# Patient Record
Sex: MALE | Race: OTHER | Employment: OTHER | URBAN - METROPOLITAN AREA
[De-identification: names, ages, dates, MRNs, and addresses within clinical notes are randomized per-mention and may not be internally consistent; named-entity substitution may affect disease eponyms.]

---

## 2017-03-01 ENCOUNTER — HOSPITAL ENCOUNTER (OUTPATIENT)
Dept: PHYSICAL THERAPY | Age: 37
Setting detail: THERAPIES SERIES
Discharge: HOME | End: 2017-03-01
Payer: COMMERCIAL

## 2017-03-01 PROCEDURE — 97162 PT EVAL MOD COMPLEX 30 MIN: CPT

## 2017-03-01 PROCEDURE — G0283 ELEC STIM OTHER THAN WOUND: HCPCS

## 2017-03-01 PROCEDURE — 97110 THERAPEUTIC EXERCISES: CPT

## 2017-03-01 ASSESSMENT — PAIN DESCRIPTION - LOCATION: LOCATION: BACK

## 2017-03-01 ASSESSMENT — PAIN DESCRIPTION - DESCRIPTORS: DESCRIPTORS: ACHING

## 2017-03-01 ASSESSMENT — PAIN SCALES - GENERAL: PAINLEVEL_OUTOF10: 8

## 2017-03-01 ASSESSMENT — PAIN DESCRIPTION - ORIENTATION: ORIENTATION: LOWER

## 2017-03-07 ENCOUNTER — HOSPITAL ENCOUNTER (OUTPATIENT)
Dept: MRI IMAGING | Age: 37
Discharge: HOME | End: 2017-03-07
Payer: COMMERCIAL

## 2017-03-07 VITALS — HEIGHT: 68 IN | WEIGHT: 170 LBS | BODY MASS INDEX: 25.76 KG/M2

## 2017-03-07 DIAGNOSIS — G89.29 CHRONIC PAIN OF RIGHT KNEE: ICD-10-CM

## 2017-03-07 DIAGNOSIS — M25.561 CHRONIC PAIN OF RIGHT KNEE: ICD-10-CM

## 2017-03-07 PROCEDURE — 73721 MRI JNT OF LWR EXTRE W/O DYE: CPT

## 2017-03-09 ENCOUNTER — HOSPITAL ENCOUNTER (OUTPATIENT)
Dept: PHYSICAL THERAPY | Age: 37
Setting detail: THERAPIES SERIES
Discharge: HOME | End: 2017-03-09
Payer: COMMERCIAL

## 2017-03-09 PROCEDURE — 97140 MANUAL THERAPY 1/> REGIONS: CPT

## 2017-03-09 PROCEDURE — 97032 APPL MODALITY 1+ESTIM EA 15: CPT

## 2017-03-09 PROCEDURE — 97110 THERAPEUTIC EXERCISES: CPT

## 2017-03-09 ASSESSMENT — PAIN DESCRIPTION - DESCRIPTORS: DESCRIPTORS: ACHING

## 2017-03-09 ASSESSMENT — PAIN DESCRIPTION - PAIN TYPE: TYPE: CHRONIC PAIN

## 2017-03-09 ASSESSMENT — PAIN SCALES - GENERAL: PAINLEVEL_OUTOF10: 6

## 2017-03-09 ASSESSMENT — PAIN DESCRIPTION - LOCATION: LOCATION: BACK

## 2017-03-09 ASSESSMENT — PAIN DESCRIPTION - ORIENTATION: ORIENTATION: LOWER

## 2017-03-10 ENCOUNTER — HOSPITAL ENCOUNTER (OUTPATIENT)
Dept: PHYSICAL THERAPY | Age: 37
Setting detail: THERAPIES SERIES
Discharge: HOME | End: 2017-03-10
Payer: COMMERCIAL

## 2017-03-17 ENCOUNTER — HOSPITAL ENCOUNTER (OUTPATIENT)
Dept: PHYSICAL THERAPY | Age: 37
Setting detail: THERAPIES SERIES
Discharge: HOME | End: 2017-03-17
Payer: COMMERCIAL

## 2017-03-17 PROCEDURE — G0283 ELEC STIM OTHER THAN WOUND: HCPCS

## 2017-03-17 PROCEDURE — 97110 THERAPEUTIC EXERCISES: CPT

## 2017-03-17 ASSESSMENT — PAIN DESCRIPTION - LOCATION: LOCATION: KNEE;BACK

## 2017-03-17 ASSESSMENT — PAIN SCALES - GENERAL: PAINLEVEL_OUTOF10: 10

## 2017-03-17 ASSESSMENT — PAIN DESCRIPTION - FREQUENCY: FREQUENCY: CONTINUOUS

## 2017-03-17 ASSESSMENT — PAIN DESCRIPTION - ORIENTATION: ORIENTATION: RIGHT;LEFT

## 2017-03-17 ASSESSMENT — PAIN DESCRIPTION - DESCRIPTORS: DESCRIPTORS: SHARP

## 2017-03-22 ENCOUNTER — HOSPITAL ENCOUNTER (OUTPATIENT)
Dept: PHYSICAL THERAPY | Age: 37
Setting detail: THERAPIES SERIES
Discharge: HOME | End: 2017-03-22
Payer: COMMERCIAL

## 2017-03-24 ENCOUNTER — HOSPITAL ENCOUNTER (OUTPATIENT)
Dept: PHYSICAL THERAPY | Age: 37
Setting detail: THERAPIES SERIES
Discharge: HOME | End: 2017-03-24
Payer: COMMERCIAL

## 2017-03-29 ENCOUNTER — HOSPITAL ENCOUNTER (OUTPATIENT)
Dept: PHYSICAL THERAPY | Age: 37
Setting detail: THERAPIES SERIES
Discharge: HOME | End: 2017-03-29
Payer: COMMERCIAL

## 2017-03-29 PROCEDURE — G0283 ELEC STIM OTHER THAN WOUND: HCPCS

## 2017-03-29 PROCEDURE — 97110 THERAPEUTIC EXERCISES: CPT

## 2017-03-29 ASSESSMENT — PAIN DESCRIPTION - PAIN TYPE: TYPE: CHRONIC PAIN

## 2017-03-29 ASSESSMENT — PAIN SCALES - GENERAL: PAINLEVEL_OUTOF10: 8

## 2017-03-29 ASSESSMENT — PAIN DESCRIPTION - ORIENTATION: ORIENTATION: POSTERIOR;RIGHT

## 2017-03-29 ASSESSMENT — PAIN DESCRIPTION - LOCATION: LOCATION: KNEE

## 2017-03-29 ASSESSMENT — PAIN DESCRIPTION - DESCRIPTORS: DESCRIPTORS: SHARP

## 2017-03-29 ASSESSMENT — PAIN DESCRIPTION - FREQUENCY: FREQUENCY: CONTINUOUS

## 2017-03-31 ENCOUNTER — HOSPITAL ENCOUNTER (OUTPATIENT)
Dept: PHYSICAL THERAPY | Age: 37
Setting detail: THERAPIES SERIES
Discharge: HOME | End: 2017-03-31
Payer: COMMERCIAL

## 2017-03-31 PROCEDURE — 97110 THERAPEUTIC EXERCISES: CPT

## 2017-03-31 PROCEDURE — G0283 ELEC STIM OTHER THAN WOUND: HCPCS

## 2017-03-31 ASSESSMENT — PAIN DESCRIPTION - ORIENTATION: ORIENTATION: RIGHT;OTHER (COMMENT)

## 2017-03-31 ASSESSMENT — PAIN SCALES - GENERAL: PAINLEVEL_OUTOF10: 4

## 2017-03-31 ASSESSMENT — PAIN DESCRIPTION - PAIN TYPE: TYPE: ACUTE PAIN

## 2017-03-31 ASSESSMENT — PAIN DESCRIPTION - LOCATION: LOCATION: KNEE

## 2017-03-31 ASSESSMENT — PAIN DESCRIPTION - DESCRIPTORS: DESCRIPTORS: ACHING

## 2017-04-05 ENCOUNTER — HOSPITAL ENCOUNTER (OUTPATIENT)
Dept: PHYSICAL THERAPY | Age: 37
Setting detail: THERAPIES SERIES
Discharge: HOME | End: 2017-04-05
Payer: COMMERCIAL

## 2017-04-07 ENCOUNTER — HOSPITAL ENCOUNTER (OUTPATIENT)
Dept: PHYSICAL THERAPY | Age: 37
Setting detail: THERAPIES SERIES
Discharge: HOME | End: 2017-04-07
Payer: COMMERCIAL

## 2017-04-07 PROCEDURE — 97110 THERAPEUTIC EXERCISES: CPT

## 2017-04-07 PROCEDURE — G0283 ELEC STIM OTHER THAN WOUND: HCPCS

## 2017-04-07 ASSESSMENT — PAIN DESCRIPTION - LOCATION: LOCATION: KNEE

## 2017-04-07 ASSESSMENT — PAIN DESCRIPTION - DESCRIPTORS: DESCRIPTORS: SHARP

## 2017-04-07 ASSESSMENT — PAIN DESCRIPTION - PAIN TYPE: TYPE: ACUTE PAIN

## 2017-04-07 ASSESSMENT — PAIN DESCRIPTION - ORIENTATION: ORIENTATION: RIGHT

## 2017-04-07 ASSESSMENT — PAIN DESCRIPTION - FREQUENCY: FREQUENCY: CONTINUOUS

## 2017-04-07 ASSESSMENT — PAIN SCALES - GENERAL: PAINLEVEL_OUTOF10: 7

## 2017-04-12 ENCOUNTER — HOSPITAL ENCOUNTER (OUTPATIENT)
Dept: PHYSICAL THERAPY | Age: 37
Setting detail: THERAPIES SERIES
Discharge: HOME | End: 2017-04-12
Payer: COMMERCIAL

## 2017-04-12 PROBLEM — S83.249A ACUTE MEDIAL MENISCUS TEAR: Status: ACTIVE | Noted: 2017-04-12

## 2017-04-14 ENCOUNTER — HOSPITAL ENCOUNTER (OUTPATIENT)
Dept: PHYSICAL THERAPY | Age: 37
Setting detail: THERAPIES SERIES
Discharge: HOME | End: 2017-04-14
Payer: COMMERCIAL

## 2017-07-21 PROBLEM — M47.812 CERVICAL SPONDYLOSIS WITHOUT MYELOPATHY: Status: ACTIVE | Noted: 2017-07-21

## 2020-02-24 ENCOUNTER — APPOINTMENT (OUTPATIENT)
Dept: GENERAL RADIOLOGY | Age: 40
End: 2020-02-24
Payer: COMMERCIAL

## 2020-02-24 ENCOUNTER — HOSPITAL ENCOUNTER (EMERGENCY)
Age: 40
Discharge: HOME | End: 2020-02-25
Payer: COMMERCIAL

## 2020-02-24 ENCOUNTER — APPOINTMENT (OUTPATIENT)
Dept: CT IMAGING | Age: 40
End: 2020-02-24
Payer: COMMERCIAL

## 2020-02-24 VITALS
DIASTOLIC BLOOD PRESSURE: 65 MMHG | SYSTOLIC BLOOD PRESSURE: 104 MMHG | HEART RATE: 64 BPM | WEIGHT: 175 LBS | HEIGHT: 67 IN | RESPIRATION RATE: 18 BRPM | BODY MASS INDEX: 27.47 KG/M2 | OXYGEN SATURATION: 97 % | TEMPERATURE: 98.7 F

## 2020-02-24 LAB
ALBUMIN SERPL-MCNC: 3.5 G/DL (ref 3.5–4.6)
ALP BLD-CCNC: 54 U/L (ref 35–104)
ALT SERPL-CCNC: 20 U/L (ref 0–41)
ANION GAP SERPL CALCULATED.3IONS-SCNC: 10 MEQ/L (ref 9–15)
APTT: 29.8 SEC (ref 24.4–36.8)
AST SERPL-CCNC: 15 U/L (ref 0–40)
BASOPHILS ABSOLUTE: 0.1 K/UL (ref 0–0.2)
BASOPHILS RELATIVE PERCENT: 0.7 %
BILIRUB SERPL-MCNC: <0.2 MG/DL (ref 0.2–0.7)
BUN BLDV-MCNC: 7 MG/DL (ref 6–20)
CALCIUM SERPL-MCNC: 8.5 MG/DL (ref 8.5–9.9)
CHLORIDE BLD-SCNC: 104 MEQ/L (ref 95–107)
CO2: 26 MEQ/L (ref 20–31)
CREAT SERPL-MCNC: 0.69 MG/DL (ref 0.7–1.2)
EOSINOPHILS ABSOLUTE: 0.5 K/UL (ref 0–0.7)
EOSINOPHILS RELATIVE PERCENT: 4.2 %
GFR AFRICAN AMERICAN: >60
GFR NON-AFRICAN AMERICAN: >60
GLOBULIN: 2 G/DL (ref 2.3–3.5)
GLUCOSE BLD-MCNC: 104 MG/DL (ref 70–99)
HCT VFR BLD CALC: 43.9 % (ref 42–52)
HEMOGLOBIN: 14.9 G/DL (ref 14–18)
INR BLD: 0.9
LYMPHOCYTES ABSOLUTE: 3.6 K/UL (ref 1–4.8)
LYMPHOCYTES RELATIVE PERCENT: 33.2 %
MCH RBC QN AUTO: 30.4 PG (ref 27–31.3)
MCHC RBC AUTO-ENTMCNC: 33.9 % (ref 33–37)
MCV RBC AUTO: 89.8 FL (ref 80–100)
MONOCYTES ABSOLUTE: 1.3 K/UL (ref 0.2–0.8)
MONOCYTES RELATIVE PERCENT: 11.7 %
NEUTROPHILS ABSOLUTE: 5.4 K/UL (ref 1.4–6.5)
NEUTROPHILS RELATIVE PERCENT: 50.2 %
PDW BLD-RTO: 13.8 % (ref 11.5–14.5)
PLATELET # BLD: 206 K/UL (ref 130–400)
POC CREATININE WHOLE BLOOD: 0.9
POTASSIUM SERPL-SCNC: 3.7 MEQ/L (ref 3.4–4.9)
PROTHROMBIN TIME: 12 SEC (ref 12.3–14.9)
RBC # BLD: 4.89 M/UL (ref 4.7–6.1)
SODIUM BLD-SCNC: 140 MEQ/L (ref 135–144)
TOTAL PROTEIN: 5.5 G/DL (ref 6.3–8)
WBC # BLD: 10.8 K/UL (ref 4.8–10.8)

## 2020-02-24 PROCEDURE — 80053 COMPREHEN METABOLIC PANEL: CPT

## 2020-02-24 PROCEDURE — 99284 EMERGENCY DEPT VISIT MOD MDM: CPT

## 2020-02-24 PROCEDURE — 73030 X-RAY EXAM OF SHOULDER: CPT

## 2020-02-24 PROCEDURE — 85730 THROMBOPLASTIN TIME PARTIAL: CPT

## 2020-02-24 PROCEDURE — 72125 CT NECK SPINE W/O DYE: CPT

## 2020-02-24 PROCEDURE — 85610 PROTHROMBIN TIME: CPT

## 2020-02-24 PROCEDURE — 73590 X-RAY EXAM OF LOWER LEG: CPT

## 2020-02-24 PROCEDURE — 36415 COLL VENOUS BLD VENIPUNCTURE: CPT

## 2020-02-24 PROCEDURE — 6360000004 HC RX CONTRAST MEDICATION: Performed by: PHYSICIAN ASSISTANT

## 2020-02-24 PROCEDURE — 85025 COMPLETE CBC W/AUTO DIFF WBC: CPT

## 2020-02-24 PROCEDURE — 74177 CT ABD & PELVIS W/CONTRAST: CPT

## 2020-02-24 PROCEDURE — 73564 X-RAY EXAM KNEE 4 OR MORE: CPT

## 2020-02-24 RX ORDER — TIZANIDINE 2 MG/1
2 TABLET ORAL EVERY 8 HOURS PRN
Qty: 15 TABLET | Refills: 0 | Status: SHIPPED | OUTPATIENT
Start: 2020-02-24 | End: 2020-12-18 | Stop reason: SDUPTHER

## 2020-02-24 RX ORDER — MELOXICAM 7.5 MG/1
15 TABLET ORAL DAILY
Qty: 30 TABLET | Refills: 0 | Status: SHIPPED | OUTPATIENT
Start: 2020-02-24

## 2020-02-24 RX ADMIN — IOPAMIDOL 100 ML: 755 INJECTION, SOLUTION INTRAVENOUS at 22:30

## 2020-02-24 ASSESSMENT — PAIN DESCRIPTION - PAIN TYPE: TYPE: ACUTE PAIN

## 2020-02-24 ASSESSMENT — PAIN DESCRIPTION - LOCATION: LOCATION: BACK

## 2020-02-24 ASSESSMENT — PAIN DESCRIPTION - ORIENTATION: ORIENTATION: MID;LOWER

## 2020-02-24 ASSESSMENT — PAIN DESCRIPTION - DESCRIPTORS: DESCRIPTORS: SHARP;BURNING

## 2020-02-25 LAB
GFR AFRICAN AMERICAN: >60
GFR NON-AFRICAN AMERICAN: >60
PERFORMED ON: NORMAL
POC CREATININE: 0.9 MG/DL (ref 0.9–1.3)
POC SAMPLE TYPE: NORMAL

## 2020-02-25 ASSESSMENT — ENCOUNTER SYMPTOMS
EYE DISCHARGE: 0
SHORTNESS OF BREATH: 0
APNEA: 0
COUGH: 0
BACK PAIN: 1
PHOTOPHOBIA: 0
ANAL BLEEDING: 0
ABDOMINAL DISTENTION: 0
VOMITING: 0
VOICE CHANGE: 0
NAUSEA: 0

## 2020-02-25 NOTE — ED NOTES
CT informed of I-stat Creat 0.9. They will call for patient when ready.      Jose Ji RN  02/24/20 [id number]

## 2020-02-25 NOTE — ED PROVIDER NOTES
bruise/bleed easily. Psychiatric/Behavioral: Negative for behavioral problems, self-injury and sleep disturbance. All other systems reviewed and are negative. Except as noted above the remainder of the review of systems was reviewed and negative. PAST MEDICAL HISTORY     Past Medical History:   Diagnosis Date    Chronic back pain          SURGICALHISTORY       Past Surgical History:   Procedure Laterality Date    HERNIA REPAIR      NECK SURGERY      tumor removed from neck         CURRENT MEDICATIONS       Previous Medications    ATOMOXETINE (STRATTERA) 40 MG CAPSULE    Take 40 mg by mouth daily    ATOMOXETINE (STRATTERA) 80 MG CAPSULE    TAKE ONE CAPSULE BY MOUTH EVERY DAY IN THE MORNING    BUPROPION (WELLBUTRIN SR) 100 MG SR TABLET        DICLOFENAC SODIUM 1 % GEL    APPLY 4 GRAMS TOPICALLY TO KNEE JOINT 4 TIMES A DAY AS NEEDED FOR PAIN    GABAPENTIN (NEURONTIN) 600 MG TABLET    TAKE 1 TABLET BY MOUTH 4 TIMES DAILY    HYDROXYZINE (VISTARIL) 50 MG CAPSULE        VENTOLIN  (90 BASE) MCG/ACT INHALER    INHALE 2 PUFFS EVERY 4 TO 6 HOURS AS NEEDED       ALLERGIES     Patient has no known allergies. FAMILY HISTORY       Family History   Problem Relation Age of Onset    Heart Disease Mother           SOCIAL HISTORY       Social History     Socioeconomic History    Marital status: Single     Spouse name: Not on file    Number of children: Not on file    Years of education: Not on file    Highest education level: Not on file   Occupational History    Not on file   Social Needs    Financial resource strain: Not on file    Food insecurity:     Worry: Not on file     Inability: Not on file    Transportation needs:     Medical: Not on file     Non-medical: Not on file   Tobacco Use    Smoking status: Current Every Day Smoker     Types: Cigarettes    Smokeless tobacco: Never Used   Substance and Sexual Activity    Alcohol use:  Yes     Alcohol/week: 0.0 standard drinks     Comment: RARELY Heart sounds: Normal heart sounds. Pulmonary:      Effort: Pulmonary effort is normal. No respiratory distress. Breath sounds: Normal breath sounds. No stridor. No wheezing or rhonchi. Chest:      Chest wall: No tenderness. Abdominal:      General: Bowel sounds are normal. There is no distension. Palpations: Abdomen is soft. Tenderness: There is no abdominal tenderness. Musculoskeletal: Normal range of motion. General: Tenderness present. No deformity. Back:         Arms:         Legs:    Skin:     General: Skin is warm. Findings: No erythema. Neurological:      Mental Status: He is alert and oriented to person, place, and time. DIAGNOSTIC RESULTS     EKG: All EKG's are interpreted by the Emergency Department Physician who either signs or Co-signsthis chart in the absence of a cardiologist.         RADIOLOGY:   Non-plain filmimages such as CT, Ultrasound and MRI are read by the radiologist. Plain radiographic images are visualized and preliminarily interpreted by the emergency physician with the below findings:    See CAT scan reports and preliminary radiology reports.   Negative fracture    Interpretation per the Radiologist below, if available at the time ofthis note:    [address]    (Results Pending)   CT ABDOMEN PELVIS W IV CONTRAST Additional Contrast? None    (Results Pending)   XR SHOULDER RIGHT (MIN 2 VIEWS)    (Results Pending)   XR KNEE RIGHT (MIN 4 VIEWS)    (Results Pending)   XR TIBIA FIBULA LEFT (2 VIEWS)    (Results Pending)         ED BEDSIDE ULTRASOUND:   Performed by ED Physician - none    LABS:  Labs Reviewed   COMPREHENSIVE METABOLIC PANEL - Abnormal; Notable for the following components:       Result Value    Glucose 104 (*)     CREATININE 0.69 (*)     Total Protein 5.5 (*)     Globulin 2.0 (*)     All other components within normal limits   CBC WITH AUTO DIFFERENTIAL - Abnormal; Notable for the following components:

## 2021-06-07 ENCOUNTER — OFFICE VISIT (OUTPATIENT)
Dept: PAIN MANAGEMENT | Age: 41
End: 2021-06-07
Payer: COMMERCIAL

## 2021-06-07 VITALS — TEMPERATURE: 96.9 F | WEIGHT: 185 LBS | HEIGHT: 69 IN | BODY MASS INDEX: 27.4 KG/M2

## 2021-06-07 DIAGNOSIS — M79.604 RIGHT LEG PAIN: ICD-10-CM

## 2021-06-07 DIAGNOSIS — M47.817 LUMBOSACRAL SPONDYLOSIS WITHOUT MYELOPATHY: Primary | ICD-10-CM

## 2021-06-07 DIAGNOSIS — M47.816 SPONDYLOSIS OF LUMBAR REGION WITHOUT MYELOPATHY OR RADICULOPATHY: ICD-10-CM

## 2021-06-07 PROCEDURE — 4004F PT TOBACCO SCREEN RCVD TLK: CPT | Performed by: PHYSICAL MEDICINE & REHABILITATION

## 2021-06-07 PROCEDURE — 99213 OFFICE O/P EST LOW 20 MIN: CPT | Performed by: PHYSICAL MEDICINE & REHABILITATION

## 2021-06-07 PROCEDURE — G8419 CALC BMI OUT NRM PARAM NOF/U: HCPCS | Performed by: PHYSICAL MEDICINE & REHABILITATION

## 2021-06-07 PROCEDURE — G8427 DOCREV CUR MEDS BY ELIG CLIN: HCPCS | Performed by: PHYSICAL MEDICINE & REHABILITATION

## 2021-06-07 RX ORDER — TIZANIDINE 2 MG/1
2 TABLET ORAL 2 TIMES DAILY PRN
Qty: 30 TABLET | Refills: 0 | Status: SHIPPED | OUTPATIENT
Start: 2021-06-07 | End: 2021-07-06 | Stop reason: SDUPTHER

## 2021-06-07 RX ORDER — GABAPENTIN 600 MG/1
600 TABLET ORAL 4 TIMES DAILY
Qty: 120 TABLET | Refills: 0 | Status: SHIPPED | OUTPATIENT
Start: 2021-06-07 | End: 2021-07-06 | Stop reason: SDUPTHER

## 2021-06-07 ASSESSMENT — ENCOUNTER SYMPTOMS
NAUSEA: 0
DIARRHEA: 0
CONSTIPATION: 0
BACK PAIN: 1
SHORTNESS OF BREATH: 0

## 2021-06-07 NOTE — PROGRESS NOTES
Jhonny Andres  ([birth year])    6/7/2021    Subjective:     Jhonny Andres is 41 y.o. male who complains today of:    Chief Complaint   Patient presents with    Back Pain     Seen by me 8/18/20: His mother has been okay. He notes that his pain has been \" jiggering around. \"  He follows with Dr. Nora Dobson for shoulder pain and has had right levator scapulae tendon injections as well as right teres minor injection with some relief. He status post radiofrequency ablation lumbar right L2 L3-L4-L5 on 1/19/2021 and left lumbar L2 L3-L4-L5 on 2/10/2021 with greater than 80% pain relief. He is pleased. He notes that he is very active and he is the one who \"always helps others. \"   Has a difficult time obtaining transportation. No recent therapy. No tests or updates mother physicians. No emergency room visits. Gabapentin 600 mg QID and Robaxin 750 mg BID help with remaining functional with chores, personal hygiene, remaining compliant with home exercise program, maintaining his quality of life, and performing activities of daily living. He obtains greater than 50% pain relief without any significant side effects. He is clear to avoid driving or operating heavy machinery or to perform any activities where he may harm himself or others while on pain medications. Low back pain is a 4/10. Gets up to an 8/10. Pain is located both sides low back and down into his right leg. The pain is worse with activity and bending. The pain is better with rest and pain medicine. Is been a constant ache for over 1 year. There are no other associated symptoms or contextual factors. He denies any classic radicular symptoms, new weakness, saddle anesthesia, bowel or bladder dysfunction, or falls. Allergies:  Patient has no known allergies.     Past Medical History:   Diagnosis Date    Chronic back pain      Past Surgical History:   Procedure Laterality Date    HERNIA REPAIR      NECK SURGERY      tumor removed from neck Family History   Problem Relation Age of Onset    Heart Disease Mother      Social History     Socioeconomic History    Marital status: Single     Spouse name: Not on file    Number of children: Not on file    Years of education: Not on file    Highest education level: Not on file   Occupational History    Not on file   Tobacco Use    Smoking status: Current Every Day Smoker     Types: Cigarettes    Smokeless tobacco: Never Used   Substance and Sexual Activity    Alcohol use: Yes     Alcohol/week: 0.0 standard drinks     Comment: RARELY    Drug use: Not on file    Sexual activity: Not on file   Other Topics Concern    Not on file   Social History Narrative    Not on file     Social Determinants of Health     Financial Resource Strain:     Difficulty of Paying Living Expenses:    Food Insecurity:     Worried About Running Out of Food in the Last Year:     [address] in the Last Year:    Transportation Needs:     Lack of Transportation (Medical):      Lack of Transportation (Non-Medical):    Physical Activity:     Days of Exercise per Week:     Minutes of Exercise per Session:    Stress:     Feeling of Stress :    Social Connections:     Frequency of Communication with Friends and Family:     Frequency of Social Gatherings with Friends and Family:     Attends Yazidism Services:     Active Member of Clubs or Organizations:     Attends Club or Organization Meetings:     Marital Status:    Intimate Partner Violence:     Fear of Current or Ex-Partner:     Emotionally Abused:     Physically Abused:     Sexually Abused:        Current Outpatient Medications on File Prior to Visit   Medication Sig Dispense Refill    diclofenac sodium (VOLTAREN) 1 % GEL Apply 2 g topically 2 times daily 120 g 0    diclofenac sodium (VOLTAREN) 1 % GEL APPLY 4 GRAMS TOPICALLY TO KNEE JOINT 4 TIMES A DAY AS NEEDED FOR PAIN 500 g 1    meloxicam (MOBIC) 7.5 MG tablet Take 2 tablets by mouth daily 30 tablet 0    VENTOLIN  (90 Base) MCG/ACT inhaler INHALE 2 PUFFS EVERY 4 TO 6 HOURS AS NEEDED  2    atomoxetine (STRATTERA) 80 MG capsule TAKE ONE CAPSULE BY MOUTH EVERY DAY IN THE MORNING  2    hydrOXYzine (VISTARIL) 50 MG capsule       buPROPion (WELLBUTRIN SR) 100 MG SR tablet       atomoxetine (STRATTERA) 40 MG capsule Take 40 mg by mouth daily       No current facility-administered medications on file prior to visit. He has not tried physical therapy. Date of lastof last PT treatment: none    Back Pain  Pertinent negatives include no fever or headaches. Review of Systems   Constitutional: Negative for fever. HENT: Negative for hearing loss. Respiratory: Negative for shortness of breath. Gastrointestinal: Negative for constipation, diarrhea and nausea. Genitourinary: Negative for difficulty urinating. Musculoskeletal: Positive for back pain. Negative for neck pain. Skin: Negative for rash. Neurological: Negative for headaches. Hematological: Does not bruise/bleed easily. Psychiatric/Behavioral: Positive for sleep disturbance. Objective:     Vitals:  Temp 96.9 °F (36.1 °C)   Ht 5' 9\" (1.753 m)   Wt 185 lb (83.9 kg)   BMI 27.32 kg/m² Pain Score:   6      Physical Exam  Exam performed under coronavirus precautions  General: No acute distress  Eyes: No scleral icterus or lid lag appreciated bilaterally  ENMT: Moist mucous membranes. Hearing grossly intact bilaterally. No masses or lesions on ears or nose  Neck: Symmetric without any overt masses or lesions, trachea midline  Respiratory: Respirations are non-labored  Skin: Visualized skin is intact  Psych: Mood normal. Affect normal. Recent and remote memory intact. Judgment and insight normal.  Neurologic: Gait antalgic, no assistive devices for ambulation. Pt is alert and appropriately interactive. Pleasant and cooperative with history and exam. No signs of excessive sedation.  Responds promptly and appropriately to questions asked. No aberrant behaviors appreciated. Left lumbar paraspinal tenderness  Right L5 paresthesias. Otherwise sensation grossly intact in both legs. Reflexes and strength functional for ambulation, no abnormal reflexes appreciated on exam today  Strength greater than 3/5 bilateral legs  Straight leg raise negative bilaterally. XR R knee 11/8/2018: No fracture. Joint spaces preserved. XR L-spine 11/8/2018: Degenerative disease and facet arthropathy. No fracture. CT C-spine 2/24/2020: Facet arthropathy. No high-grade neural foraminal or spinal canal stenosis. No fracture. XR R shoulder 2/24/2020: No fracture. No acute osseous abnormality  XR R knee 2/24/2020: No fracture, negative right knee  XR C Spine 6/27/17: degenerative disc disease and facet arthropathy, no fracture. MRI R Knee 3/7/17: horizontal tear posterior horn of medial meniscus. XR LS Spine 8/10/16: 5 lumbar vertebrae, no fracture, min degen changes. XR LS SPine 1/13/15: narrowing L4/5 level. Minimal degen changes  MRI LS Spine 3/13/15: disc narrowing and bulging at L4/5, degenerative changes also noted at L5/S1. EMG B LE 3/1/16: This study is normal. There is no current electrodiagnostic evidence for active lumbosacral motor radiculopathy, lumbosacral plexopathy, or generalized large fiber sensorimotor peripheral polyneuropathy. Assessment:      Diagnosis Orders   1. Lumbosacral spondylosis without myelopathy  Amb External Referral To Physical Therapy   2. Right leg pain  EMG     -Methocarbamol Robaxin 1500mg BID, Gabapentin 600mg QID min relief. Min relief Meloxicam 15mg. side effects Tizanidine 2mg. Min relief Magnesium 400mg daily. EMLA oint min relief. Plan:     Periodic Controlled Substance Monitoring: Obtaining appropriate analgesic effect of treatment.  Mary Ellen Sher MD)    Orders Placed This Encounter   Medications    tiZANidine (ZANAFLEX) 2 MG tablet     Sig: Take 1 tablet by mouth 2 times daily as needed (muscle spasm)     Dispense:  30 tablet     Refill:  0    gabapentin (NEURONTIN) 600 MG tablet     Sig: Take 1 tablet by mouth 4 times daily for 30 days. Dispense:  120 tablet     Refill:  0       Orders Placed This Encounter   Procedures    MRI LUMBAR SPINE WO CONTRAST     Standing Status:   Future     Standing Expiration Date:   9/5/2021     Order Specific Question:   Reason for exam:     Answer:   eval canal stenosis    Amb External Referral To Physical Therapy     Referral Priority:   Routine     Referral Type:   Consult for Advice and Opinion     Referral Reason:   Specialty Services Required     Requested Specialty:   Physical Therapy     Number of Visits Requested:   1    EMG     Standing Status:   Future     Standing Expiration Date:   9/5/2021     Order Specific Question:   Which body part? Answer:   Bilateral lower extremities     Controlled Substance Monitoring:    Acute and Chronic Pain Monitoring:   RX Monitoring 6/7/2021   Attestation -   Periodic Controlled Substance Monitoring Obtaining appropriate analgesic effect of treatment. -PT for lumbar spondylosis  -Encourage evaluation with Dr Drew Donald chiropractic treatment  -Reviewed CT C-spine, XR R knee, XR L-spine, XR R shoulder above, all questions answered  -Re order EMG B LE eval right leg pain  -Given persistent symptoms despite conservative treatment and interventions, unable to tolerate NSAIDs recommend:  -MRI LS spine without contrast eval canal stenosis   -Continue Gabapentin 600 mg QID #120 no ref st 6/7/2021 [place] on 6/7/2021  -Continue Robaxin Methocarbamol 750 mg BID prn #60 no ref st 6/7/2021 Avoid all other muscle relaxers and/or sedating medicines.   -May restart Diclofenac gel in the near future  -No opioids recommended at this time  -Consideration for lumbar transforaminal injection will be given pending MRI review     Controlled Substance Monitoring:    Acute and Chronic Pain Monitoring: RX Monitoring 6/7/2021   Attestation -   Periodic Controlled Substance Monitoring Obtaining appropriate analgesic effect of treatment. Discussed the risks, side effects, and symptoms that would warrant urgent or emergent physician evaluation of all medications prescribed today. Discussed the risks of the above recommended procedures including but not limited to bleeding, infection, worsened pain, damage to surrounding structures, side effects, toxicity, allergic reactions to medications used, immune and stress-response dysfunction, fat necrosis, skin pigmentation changes, blood sugar elevation, headache, vision changes, need for surgery, as well as catastrophic injury such as vision loss, paralysis, stroke, spinal cord and/or plexus infarction or injury, intrathecal injection, spinal cord puncture, arachnoiditis, discitis, bowel or bladder incontinence, ventilator dependence, loss of use of the arms and/or legs, and death. Discussed the risks, benefits, alternative procedures, and alternatives to the procedure including no procedure at all. Discussed that we cannot undo any permanent neurologic damage or change the course of any underlying disease. After thorough discussion, patient expressed understanding and willingness to proceed. Provided education and counseling regarding the diagnosis, prognosis, and treatment options. All questions were answered. Encouraged him to follow-up with his primary care physician and/or specialists as required for his overall health and management of his comorbidities as well as any new positive symptoms mentioned in review of systems above. Care was provided within the definitions and limitations of our specialty practice. Encouraged lifestyle interventions including healthy habits, lifestyle changes, regular aerobic exercise and appropriate weight maintenance as advised by their primary care physician or cardiovascular health provider.  Discussed well care and disease transcribed by voice recognition resulting in errors, omissions, or close substitutions which may be missed despite careful proofreading. Please contact the author for any questions or concerns. This note was not reviewed to expedite clinical care.       Follow up:  Return in about 1 month (around 7/7/2021) for reassessment of pain and symptoms, EMG Internal.    Anais Upton MD

## 2021-06-14 DIAGNOSIS — M47.817 LUMBOSACRAL SPONDYLOSIS WITHOUT MYELOPATHY: ICD-10-CM

## 2021-06-15 RX ORDER — TIZANIDINE 2 MG/1
2 TABLET ORAL 2 TIMES DAILY PRN
Qty: 30 TABLET | Refills: 0 | OUTPATIENT
Start: 2021-06-15 | End: 2021-06-30

## 2021-06-29 ENCOUNTER — TELEPHONE (OUTPATIENT)
Dept: PAIN MANAGEMENT | Age: 41
End: 2021-06-29

## 2021-06-29 NOTE — TELEPHONE ENCOUNTER
BENEFITS:    Insurance: CRS  Phone: [phone]  Contact Name: JAZMYN Desouza Date: 3. 1.2019     Plan year: MONTH  Deductible: N/A      Deductible Met: N/A  Allowed/benefits paid at: 100%  OOP: N/A  Freq Limits: NO-BASED ON MEDICAL NECESSITY  Prior Auth Requirement: NO    Notes: NO PRE-EX CLAUSE    Call Reference #: [id number]    Time of call: 11:10AM

## 2021-06-30 ENCOUNTER — OFFICE VISIT (OUTPATIENT)
Dept: PAIN MANAGEMENT | Age: 41
End: 2021-06-30
Payer: COMMERCIAL

## 2021-06-30 DIAGNOSIS — M79.604 RIGHT LEG PAIN: ICD-10-CM

## 2021-06-30 PROCEDURE — 95886 MUSC TEST DONE W/N TEST COMP: CPT | Performed by: PHYSICAL MEDICINE & REHABILITATION

## 2021-06-30 PROCEDURE — 95911 NRV CNDJ TEST 9-10 STUDIES: CPT | Performed by: PHYSICAL MEDICINE & REHABILITATION

## 2021-06-30 NOTE — PROGRESS NOTES
Electromyography (EMG)/Nerve conduction studies (NCS) Report: Lower Extremity    Name: Rosa Mathew   YOB: 1980  Date of Service: 6/30/2021   Provider: Scarlet Hidalgo MD        INDICATIONS:  Rosa Mathew is a 41 y.o. male who presents for electrodiagnostic evaluation for right leg pain. Both limbs are necessary to examine in order to evaluate for any evidence of systemic disease as well as establish normal baseline values from which to compare any abnormal unilateral findings. The study is explained and verbal consent to proceed is obtained. NERVE CONDUCTION STUDIES:    Sensory nerve conduction studies: Bilateral sural and superficial peroneal sensory nerve conduction studies demonstrate normal distal latencies and amplitudes. Bilateral lower limb temperatures are normal.     Motor nerve conduction studies: Bilateral peroneal motor nerve conduction studies with pickup over the extensor digitorum brevis demonstrate normal distal latencies and amplitudes. Bilateral tibial motor nerve conduction studies with pickup over the abductor hallucis demonstrate normal distal latencies and amplitudes. H reflex: Bilateral H reflexes are difficult to elicit secondary to tolerance. ELECTROMYOGRAPHY: A disposable monopolar needle is used to evaluate bilateral vastus medialis, tibialis anterior, extensor hallucis longus, peroneus longus, and medial gastrocnemius. All of the muscles sampled are free of any increased insertional activity or any abnormal spontaneous activity. Motor unit recruitment is unremarkable. Bilateral low lumbar paraspinal muscle sampling is free of any increased insertional activity or any abnormal spontaneous activity. SUMMARY:  This study is normal. There is no current electrodiagnostic evidence for an active bilateral lumbosacral motor radiculopathy or generalized large fiber sensorimotor peripheral polyneuropathy.      RECOMMENDATIONS: Today's study does not explain the patient's right leg pain. When compared to the NCS/EMG from 3/1/16, today's study is essentially similar. The patient should follow up as previously instructed. If his symptoms persist or worsen, further electrodiagnostic evaluation may be considered if the patient is agreeable. Clinical correlation is recommended.

## 2021-07-01 ENCOUNTER — TELEPHONE (OUTPATIENT)
Dept: PAIN MANAGEMENT | Age: 41
End: 2021-07-01

## 2021-07-01 NOTE — TELEPHONE ENCOUNTER
Spoke with patient, [facility] has not contacted him to schedule MRI. [facility] ([phone]), he will call [facility] to schedule.

## 2021-07-06 DIAGNOSIS — M47.817 LUMBOSACRAL SPONDYLOSIS WITHOUT MYELOPATHY: ICD-10-CM

## 2021-07-06 RX ORDER — TIZANIDINE 2 MG/1
2 TABLET ORAL 2 TIMES DAILY PRN
Qty: 30 TABLET | Refills: 0 | Status: SHIPPED | OUTPATIENT
Start: 2021-07-06 | End: 2021-07-21 | Stop reason: SDUPTHER

## 2021-07-06 RX ORDER — GABAPENTIN 600 MG/1
600 TABLET ORAL 4 TIMES DAILY
Qty: 56 TABLET | Refills: 0 | Status: SHIPPED | OUTPATIENT
Start: 2021-07-07 | End: 2021-07-21 | Stop reason: SDUPTHER

## 2021-07-06 NOTE — TELEPHONE ENCOUNTER
-Continue Tizanidine 2 mg #30 no ref st 7/6-7/21, 15 days  -Continue Gabapentin 600 mg QID, 7/7-7/21, 14 days, #56  -Keep follow up on 7/20/21    Controlled Substance Monitoring:    Acute and Chronic Pain Monitoring:   RX Monitoring 7/6/2021   Attestation -   Periodic Controlled Substance Monitoring Obtaining appropriate analgesic effect of treatment.

## 2021-07-06 NOTE — TELEPHONE ENCOUNTER
Requested Prescriptions     Pending Prescriptions Disp Refills    tiZANidine (ZANAFLEX) 2 MG tablet 30 tablet 0     Sig: Take 1 tablet by mouth 2 times daily as needed (muscle spasm)    gabapentin (NEURONTIN) 600 MG tablet 120 tablet 0     Sig: Take 1 tablet by mouth 4 times daily for 30 days.        Patient last seen on:  6/7  Date of last surgery:  n/a  Date of last refill:  6/7  Pain level:  n/a  Patient complaining of:  Pt asking for refill, r/sesd due to not  Having a ride  Future appts: 7/20

## 2021-07-11 NOTE — TELEPHONE ENCOUNTER
Pharmacy requesting medication refill.  Please approve or deny this request.    Rx requested:  Requested Prescriptions     Pending Prescriptions Disp Refills    diclofenac sodium (VOLTAREN) 1 % GEL [Pharmacy Med Name: DICLOFENAC SODIUM 1% GEL] 100 g      Sig: APPLY 2 G TOPICALLY 2 TIMES DAILY         Last Office Visit:   12/8/2020      Next Visit Date:  Future Appointments   Date Time Provider Guerline Bowie   7/20/2021  2:00 PM LOUIE Waters - CNP [phone]

## 2021-07-21 ENCOUNTER — TELEPHONE (OUTPATIENT)
Dept: PAIN MANAGEMENT | Age: 41
End: 2021-07-21

## 2021-07-21 DIAGNOSIS — M47.817 LUMBOSACRAL SPONDYLOSIS WITHOUT MYELOPATHY: ICD-10-CM

## 2021-07-21 NOTE — TELEPHONE ENCOUNTER
"   Patient:   Gladys Mittal            MRN: [id number]            FIN: [id number]               Age:   10 years     Sex:  Male     :  2005   Associated Diagnoses:   None   Author:   Abilio Sal MD, Lia Rose      Basic Information   Additional information: Chief Complaint from Nursing Triage Note : Chief Complaint   11/10/2019 1:00          Chief Complaint           ""disoriented\"" , patient states he does not feel himself, anxious  . History of Present Illness   11y M presents after situation with family. Last nightpt reports that his step father drank too much and was yelling. He was crazed out and deranged. Made hurtful verbal statements. Rafael Collins left the house to clear his mind, when he got back the police were called on him. Rafael Collins was also upset the dog was let in his room. This morning pt became worried because his mom was talking about speaking to step father, he couldn't deal with it. Became very worried. Had chest tightness. Denies depression, denies SI. Made commends about \""being done with things\"" \""can't handle in anymore\"" to mother. Overwhemled by this, bullying at [employer]. Kept reiterating events form last night. SUger >500 with EMS. Review of Systems   Constitutional symptoms:  No fever,    Skin symptoms:  No rash,    Eye symptoms:  No recent vision problems,    ENMT symptoms:  No sore throat,    Respiratory symptoms:  Shortness of breath. Cardiovascular symptoms:  Chest pain. Gastrointestinal symptoms:  Abdominal pain. Genitourinary symptoms:  No dysuria,    Musculoskeletal symptoms:  No Joint pain,    Neurologic symptoms:  No headache,       Health Status   Allergies: Allergic Reactions (Selected)  Severity Not Documented  Seasonale- No reactions were documented. .      Past Medical/ Family/ Social History      Medical history   Reviewed as documented in chart. Surgical history: Reviewed as documented in chart. Social history: Tobacco use: Denies.    Problem list:    Active " We received a message from [facility] that patient has not scheduled MRI Lumbar ordered by Dr. Hoang Galarza. I called and spoke with patient, he states [facility] did contact him. He is waiting for an out-of-town family member to come here so he will have a ride to the MRI. He will call [facility] when he knows he has a ride and then schedule the MRI. Patient missed his appointment with NP on 7-. Problems (1)  Diabetes mellitus   . Physical Examination               Vital Signs   Vital Signs   11/10/2019 13:45         Temperature Oral          97.4 F  LOW                             Peripheral Pulse Rate     77 bpm                             Respiratory Rate          18 br/min                             Systolic Blood Pressure   160 mmHg  HI                             Diastolic Blood Pressure  83 mmHg                             Mean Arterial Pressure    109 mmHg                             Oxygen Saturation         98 %    11/10/2019 2:00          Peripheral Pulse Rate     82 bpm                             Respiratory Rate          18 br/min                             Systolic Blood Pressure   125 mmHg                             Diastolic Blood Pressure  80 mmHg                             Mean Arterial Pressure    95 mmHg                             Oxygen Saturation         97 %    11/10/2019 0:58          Peripheral Pulse Rate     77 bpm                             Respiratory Rate          20 br/min                             Systolic Blood Pressure   128 mmHg                             Diastolic Blood Pressure  99 mmHg  HI                             Mean Arterial Pressure    109 mmHg                             Oxygen Saturation         97 %    11/10/2019 0:49          Temperature Tympanic      98.9 F                             Peripheral Pulse Rate     76 bpm                             Respiratory Rate          20 br/min                             Oxygen Saturation         99 %  . Per nurse's notes. Vital Signs were reviewed. General:  Alert, no acute distress. Skin:  Warm, dry. Head:  Normocephalic, atraumatic. Neck:  Supple, trachea midline. Eye:  Pupils are equal, round and reactive to light, extraocular movements are intact.     Ears, nose, mouth and throat:  Oral mucosa moist.   Cardiovascular:  Regular rate and rhythm, S1, S2.    Respiratory:  Lungs are clear to auscultation, respirations are non-labored, Symmetrical chest wall expansion. Gastrointestinal:  Soft, Nontender, Non distended. Musculoskeletal:  Normal ROM, no swelling, no deformity. Neurological:  Alert and oriented to person, place, time, and situation, No focal neurological deficit observed. Psychiatric:  Appropriate mood & affect. Medical Decision Making   Differential Diagnosis[de-identified]  Diabetes, diabetic ketoacidosis, hyperglycemia, medication reaction. Cardiac monitor:  Rhythm strip analyzed by myself shows sinus rhythm, normal rate, with no ectopy. .   Electrocardiogram:  EKG shows normal sinus rhythm, normal axis, with non-specific ST changes interpreted by myself. Early repol.    Results review:  Lab results : Lab View   11/10/2019 14:53         POC_Glu                   249 mg/dL  HI    11/10/2019 14:25         U Amph Scrn               Negative                             U Vanessa Scrn               Negative                             U Benzodia Scrn           Negative                             U Cocaine Scrn            Negative                             U Opiate Scrn             Negative                             U PCP Scrn                Negative                             U THC Scrn                Negative    11/10/2019 14:16         pH                        7.391                             pCO2                      35.1 mmHg  LOW                             pO2                       44.8 mmHg  HI                             HCO3                      20.8 mmol/L  LOW                             BEvt                      -3.4 mmol/L  LOW                             ABG Na                    131.2 mmol/L  LOW                             ABG K                     4.44 mmol/L                             ABG Ion Calcium           4.6 mg/dL                             ABG Glu                   550 mg/dL  CRIT                             tHb                       14.8 g/dL O2 Sat(est)               82.0 %  HI                             O2Hb                      80.4 %  HI                             COHb                      1.5 %                             MetHb                     0.4 %                             ABG Hct                   44 %                             Salvador's Test              N/A                             Draw Site                 Vein                             Sample Type               Venous    11/10/2019 14:00         POC_Glu                   499 mg/dL  CRIT    11/10/2019 13:40         Glucose Lvl               598 mg/dL  CRIT                             BUN                       11 mg/dL                             Creatinine                1.32 mg/dL  HI                             eGFR NonAfrAmer           N/A, <18 yrs mL/min/1.73m2                             Sodium                    129 mEq/L  LOW                             Potassium                 4.6 mEq/L                             Chloride                  96 mEq/L  LOW                             TCO2                      21 mEq/L                             AGAP                      17 mEq/L                             Calcium                   9.5 mg/dL                             Alk Phos                  121 unit/L                             Bili Total                0.9 mg/dL                             Total Protein             7.3 g/dL                             Albumin                   4.0 g/dL                             Globulin_                 3.3 g/dL                             A/G Ratio_                1.2  NA                             AST/GOT                   12 unit/L                             ALT/GPT                   11 unit/L                             Acetaminoph Lvl           <3.0 ug/mL  LOW                             Alcohol, Ethyl            <10 mg/dL                             Salicylate Lvl            <5.0 mg/dL Beta-Hydroxybutyrate      0.26 mmol/L                             WBC                       6.9 K/cumm                             RBC                       5.07 M/cumm                             Hgb                       14.0 g/dL                             Hct                       42 %                             MCV                       82 FL                             MCH                       28 pg                             MCHC                      34 g/dL                             RDW                       11.9 %                             Platelet                  304 K/cumm                             NRBC                      0.0 %                             Abs Neutro                4.0 K/cumm                             Neutrophil                57 %  NA                             Abs Lymph                 2.3 K/cumm                             Lymphocyte                33 %  NA                             Abs Mono                  0.4 K/cumm                             Monocyte                  6 %  NA                             Immature Gran             0.4 %  HI                             Eosinophil                2 %                             Basophil                  1 %    11/10/2019 0:56          POC_Glu                   176 mg/dL  HI  . Reexamination/ Reevaluation   Patient seen overnight, at that time behavioral issue. Family reports his consistently running over that situation his mind. Remains with significant anxiety. Made concerning statements. Was noncompliant with medications this morning although did take insulin dosage before leaving for the hospital.  In emergency room he had significant hyperglycemia, he received 1 L of fluids. He did not receive insulin but he had improvement thus far to the mid 200s. The remainder of his blood work was generally unremarkable. He had mild hyponatremia which was most likely secondary to the glucose.   Mild elevation of creatinine. Again, received 1 L of fluids. Discussed with  who will evaluate. Extensive discussion with family. Pending arian song.           Impression and Plan   Diagnosis   Hyerpglycemia   Plan   Condition: Stable. Disposition: Discharged: to home. Patient was given the following educational materials: Hyperglycemia, Panic Attacks. Follow up with: Follow up with primary care provider Within 1 to 2 days Call for follow up appointment; Return to Emergency Department Return if symptoms worsen.              Electronically Signed On 11.22.2019 22:37  ___________________________________________________   Fay Floyd MD, Angie Peers

## 2021-07-21 NOTE — TELEPHONE ENCOUNTER
Requested Prescriptions     Pending Prescriptions Disp Refills    tiZANidine (ZANAFLEX) 2 MG tablet 12 tablet 0     Sig: Take 1 tablet by mouth 2 times daily as needed (muscle spasm)    gabapentin (NEURONTIN) 600 MG tablet 24 tablet 0     Sig: Take 1 tablet by mouth 4 times daily for 6 days. Patient last seen on:  06/30 EMG  Date of last surgery:  N/A  Date of last refill:  07/07 PARTIAL  Pain level:  N/A  Patient complaining of:  PATIENT CALLED REQUESTING REFILL FOR GABAPENTIN AND TIZANIDINE.    Future appts: 07/27

## 2021-07-23 RX ORDER — GABAPENTIN 600 MG/1
600 TABLET ORAL 4 TIMES DAILY
Qty: 24 TABLET | Refills: 0 | Status: SHIPPED | OUTPATIENT
Start: 2021-07-23 | End: 2021-07-27 | Stop reason: SDUPTHER

## 2021-07-23 RX ORDER — TIZANIDINE 2 MG/1
2 TABLET ORAL 2 TIMES DAILY PRN
Qty: 12 TABLET | Refills: 0 | Status: SHIPPED | OUTPATIENT
Start: 2021-07-23 | End: 2021-07-27 | Stop reason: SDUPTHER

## 2021-07-27 ENCOUNTER — OFFICE VISIT (OUTPATIENT)
Dept: NEUROSURGERY | Age: 41
End: 2021-07-27
Payer: COMMERCIAL

## 2021-07-27 VITALS — HEIGHT: 69 IN | BODY MASS INDEX: 26.66 KG/M2 | WEIGHT: 180 LBS

## 2021-07-27 DIAGNOSIS — M47.817 LUMBOSACRAL SPONDYLOSIS WITHOUT MYELOPATHY: ICD-10-CM

## 2021-07-27 DIAGNOSIS — M47.816 SPONDYLOSIS OF LUMBAR REGION WITHOUT MYELOPATHY OR RADICULOPATHY: ICD-10-CM

## 2021-07-27 PROCEDURE — 99213 OFFICE O/P EST LOW 20 MIN: CPT | Performed by: NURSE PRACTITIONER

## 2021-07-27 PROCEDURE — 4004F PT TOBACCO SCREEN RCVD TLK: CPT | Performed by: NURSE PRACTITIONER

## 2021-07-27 PROCEDURE — G8427 DOCREV CUR MEDS BY ELIG CLIN: HCPCS | Performed by: NURSE PRACTITIONER

## 2021-07-27 PROCEDURE — G8419 CALC BMI OUT NRM PARAM NOF/U: HCPCS | Performed by: NURSE PRACTITIONER

## 2021-07-27 RX ORDER — GABAPENTIN 600 MG/1
600 TABLET ORAL 4 TIMES DAILY
Qty: 120 TABLET | Refills: 0 | Status: SHIPPED | OUTPATIENT
Start: 2021-07-27 | End: 2021-08-25 | Stop reason: SDUPTHER

## 2021-07-27 RX ORDER — TIZANIDINE 2 MG/1
2 TABLET ORAL 2 TIMES DAILY PRN
Qty: 60 TABLET | Refills: 0 | Status: SHIPPED | OUTPATIENT
Start: 2021-07-27 | End: 2021-08-25 | Stop reason: SDUPTHER

## 2021-07-27 ASSESSMENT — ENCOUNTER SYMPTOMS
BACK PAIN: 1
ABDOMINAL PAIN: 0
COLOR CHANGE: 0
RESPIRATORY NEGATIVE: 1

## 2021-07-27 NOTE — TELEPHONE ENCOUNTER
Patient came to office for a follow up with [facility]. He was given [employer] number so he may call [facility] to schedule MRI Lumbar. Patient states he is still waiting for his father to come back from [place] with his birth certificate so he can renew his drivers license.

## 2021-07-27 NOTE — PROGRESS NOTES
Patient: Rosalva Olguin  YOB: 1980  Date: 7/27/21        Subjective:     Rosalva Olguin is a 41 y.o. male who complains today of:    Chief Complaint   Patient presents with    Back Pain    Leg Pain     Left         Allergies:  Patient has no known allergies. Past Medical History:   Diagnosis Date    Chronic back pain      Past Surgical History:   Procedure Laterality Date    HERNIA REPAIR      NECK SURGERY      tumor removed from neck     Family History   Problem Relation Age of Onset    Heart Disease Mother      Social History     Socioeconomic History    Marital status: Single     Spouse name: Not on file    Number of children: Not on file    Years of education: Not on file    Highest education level: Not on file   Occupational History    Not on file   Tobacco Use    Smoking status: Current Every Day Smoker     Types: Cigarettes    Smokeless tobacco: Never Used   Substance and Sexual Activity    Alcohol use: Yes     Alcohol/week: 0.0 standard drinks     Comment: RARELY    Drug use: Not on file    Sexual activity: Not on file   Other Topics Concern    Not on file   Social History Narrative    Not on file     Social Determinants of Health     Financial Resource Strain:     Difficulty of Paying Living Expenses:    Food Insecurity:     Worried About Running Out of Food in the Last Year:     [address] in the Last Year:    Transportation Needs:     Lack of Transportation (Medical):      Lack of Transportation (Non-Medical):    Physical Activity:     Days of Exercise per Week:     Minutes of Exercise per Session:    Stress:     Feeling of Stress :    Social Connections:     Frequency of Communication with Friends and Family:     Frequency of Social Gatherings with Friends and Family:     Attends Faith Services:     Active Member of Clubs or Organizations:     Attends Club or Organization Meetings:     Marital Status:    Intimate Partner Violence:     Fear of Current or Ex-Partner:     Emotionally Abused:     Physically Abused:     Sexually Abused:        Current Outpatient Medications on File Prior to Visit   Medication Sig Dispense Refill    diclofenac sodium (VOLTAREN) 1 % GEL APPLY 4 GRAMS TOPICALLY TO KNEE JOINT 4 TIMES A DAY AS NEEDED FOR PAIN 500 g 1    meloxicam (MOBIC) 7.5 MG tablet Take 2 tablets by mouth daily 30 tablet 0    VENTOLIN  (90 Base) MCG/ACT inhaler INHALE 2 PUFFS EVERY 4 TO 6 HOURS AS NEEDED  2    atomoxetine (STRATTERA) 80 MG capsule TAKE ONE CAPSULE BY MOUTH EVERY DAY IN THE MORNING  2    hydrOXYzine (VISTARIL) 50 MG capsule       buPROPion (WELLBUTRIN SR) 100 MG SR tablet       atomoxetine (STRATTERA) 40 MG capsule Take 40 mg by mouth daily       No current facility-administered medications on file prior to visit. Pt presents today for a f/u of his chronic  low back pain. Pain today is a 10 to his left leg. He states that immediately after the EMG he developed a severe pain to his left leg posterior leg around to the knee. He said it has been constant. EMG lower extremities done June 30, 2021 was normal.  He was an hour late today states he depends on others for transportation and it is difficult for him to get to his appointments. He has not started physical therapy due to transportation. He is going to be taking his children to a water park soon. Had consult with Dr Hoa Gleason and had right shoulder pain. He has no follow-ups because his shoulder has been okay. He status post radiofrequency ablation lumbar right L2 L3-L4-L5 on 1/19/2021 and left lumbar L2 L3-L4-L5 on 2/10/2021 with greater than 80% pain relief. He does not get opioids due to previous history of marijuana use. Pt feels pain level is 5/10 without medication. Pt feels that hot weather makes the pain worse, and swimming makes the pain better. Pt feels his medication helps him function and improve his quality of life.  Pt denies radiating numbness and tingling. Denies recent falls, injuries or trauma. Pt denies new weakness.             Review of Systems   Constitutional: Negative. Negative for activity change and unexpected weight change. HENT: Negative. Respiratory: Negative. Gastrointestinal: Negative for abdominal pain. Genitourinary: Negative. Musculoskeletal: Positive for back pain. Negative for gait problem, joint swelling, neck pain and neck stiffness. Left leg   Skin: Negative for color change and rash. Neurological: Negative. Negative for dizziness, seizures, weakness, light-headedness, numbness and headaches. Psychiatric/Behavioral: Negative. Negative for sleep disturbance. Objective:     Vitals:  Ht 5' 9\" (1.753 m)   Wt 180 lb (81.6 kg)   BMI 26.58 kg/m² Pain Score:  10 - Worst pain ever    Physical Exam  Vitals reviewed. Constitutional:       Appearance: He is well-developed. HENT:      Head: Normocephalic. Nose: Nose normal.   Pulmonary:      Effort: Pulmonary effort is normal.   Musculoskeletal:      Cervical back: Normal range of motion and neck supple. Lumbar back: Tenderness present. Decreased range of motion. Negative right straight leg raise test and negative left straight leg raise test.   Lymphadenopathy:      Cervical: No cervical adenopathy. Skin:     General: Skin is warm and dry. Findings: No erythema or rash. Neurological:      Mental Status: He is alert and oriented to person, place, and time. Cranial Nerves: No cranial nerve deficit. Motor: No weakness. Gait: Gait normal.      Deep Tendon Reflexes: Reflexes are normal and symmetric. Reflexes normal.      Reflex Scores:       Patellar reflexes are 2+ on the right side and 2+ on the left side.      Comments: Able to toe raise  Says he has difficulty going on to left heel due to pain   Psychiatric:         Mood and Affect: Mood normal.         Behavior: Behavior normal.         Thought Content: Thought content normal.         Judgment: Judgment normal.          XR R knee 11/8/2018: No fracture. Joint spaces preserved. XR L-spine 11/8/2018: Degenerative disease and facet arthropathy. No fracture. CT C-spine 2/24/2020: Facet arthropathy. No high-grade neural foraminal or spinal canal stenosis. No fracture. XR R shoulder 2/24/2020: No fracture. No acute osseous abnormality  XR R knee 2/24/2020: No fracture, negative right knee  XR C Spine 6/27/17: degenerative disc disease and facet arthropathy, no fracture. MRI R Knee 3/7/17: horizontal tear posterior horn of medial meniscus. XR LS Spine 8/10/16: 5 lumbar vertebrae, no fracture, min degen changes. XR LS SPine 1/13/15: narrowing L4/5 level. Minimal degen changes  MRI LS Spine 3/13/15: disc narrowing and bulging at L4/5, degenerative changes also noted at L5/S1. EMG B LE 3/1/16: This study is normal. There is no current electrodiagnostic evidence for active lumbosacral motor radiculopathy, lumbosacral plexopathy, or generalized large fiber sensorimotor peripheral polyneuropathy  Assessment:        Diagnosis Orders   1. Lumbosacral spondylosis without myelopathy  gabapentin (NEURONTIN) 600 MG tablet    tiZANidine (ZANAFLEX) 2 MG tablet   2. Spondylosis of lumbar region without myelopathy or radiculopathy  diclofenac sodium (VOLTAREN) 1 % GEL       Plan:          Orders Placed This Encounter   Medications    gabapentin (NEURONTIN) 600 MG tablet     Sig: Take 1 tablet by mouth 4 times daily for 30 days.      Dispense:  120 tablet     Refill:  0    tiZANidine (ZANAFLEX) 2 MG tablet     Sig: Take 1 tablet by mouth 2 times daily as needed (muscle spasm)     Dispense:  60 tablet     Refill:  0    diclofenac sodium (VOLTAREN) 1 % GEL     Sig: Apply 2 g topically 2 times daily     Dispense:  100 g     Refill:  2       No orders of the defined types were placed in this encounter.  -Continue Gabapentin 600 mg QID #120 no ref st  July 27, 2021  [id number] [facility] reviewed   -Refill tizanidine 2 mg twice daily as needed spasm. Avoid all other sedating medicines. -Refill diclofenac gel  -No opioids recommended at this time  -I gave him the phone number to call to schedule his MRI. He will try to arrange transportation  -Consideration for lumbar transforaminal injection will be given pending MRI review       Follow up:  Return in about 4 weeks (around 8/24/2021) for review meds and reassess pain.     LOUIE Orr - CNP

## 2021-08-19 ENCOUNTER — TELEPHONE (OUTPATIENT)
Dept: PAIN MANAGEMENT | Age: 41
End: 2021-08-19

## 2021-08-19 DIAGNOSIS — M47.817 LUMBOSACRAL SPONDYLOSIS WITHOUT MYELOPATHY: Primary | ICD-10-CM

## 2021-08-19 DIAGNOSIS — M79.604 RIGHT LEG PAIN: ICD-10-CM

## 2021-08-19 DIAGNOSIS — M47.816 SPONDYLOSIS OF LUMBAR REGION WITHOUT MYELOPATHY OR RADICULOPATHY: ICD-10-CM

## 2021-08-19 NOTE — TELEPHONE ENCOUNTER
Pt missed MRI  Appointment. He has r/sed for 9/10 but the order that was placed will  before then. Can a new order be placed for the patient?

## 2021-08-19 NOTE — TELEPHONE ENCOUNTER
Left message on female's voicemail for patient to call. We have received a phone call from [address] [place] [address] Dr josé patient missed his MRI appointment on 2021 and the  MRI Lumbar order is going to  2021,  a new order will need to be placed. Please ask patient if he is going to proceed with rescheduling the MRI Lumbar. If not, a new order is not needed. If yes, please send a message to Dr. Joann Gonzalez explaining the  order is expiring and a new order needs to be entered in [place].

## 2021-08-25 DIAGNOSIS — M47.816 SPONDYLOSIS OF LUMBAR REGION WITHOUT MYELOPATHY OR RADICULOPATHY: ICD-10-CM

## 2021-08-25 DIAGNOSIS — M47.817 LUMBOSACRAL SPONDYLOSIS WITHOUT MYELOPATHY: ICD-10-CM

## 2021-08-25 RX ORDER — GABAPENTIN 600 MG/1
600 TABLET ORAL 4 TIMES DAILY
Qty: 28 TABLET | Refills: 0 | Status: SHIPPED | OUTPATIENT
Start: 2021-08-25 | End: 2021-09-01 | Stop reason: SDUPTHER

## 2021-08-25 RX ORDER — TIZANIDINE 2 MG/1
2 TABLET ORAL 2 TIMES DAILY PRN
Qty: 14 TABLET | Refills: 0 | Status: SHIPPED | OUTPATIENT
Start: 2021-08-25 | End: 2021-09-01 | Stop reason: SDUPTHER

## 2021-09-01 ENCOUNTER — OFFICE VISIT (OUTPATIENT)
Dept: PAIN MANAGEMENT | Age: 41
End: 2021-09-01
Payer: COMMERCIAL

## 2021-09-01 VITALS
DIASTOLIC BLOOD PRESSURE: 68 MMHG | HEIGHT: 69 IN | SYSTOLIC BLOOD PRESSURE: 108 MMHG | TEMPERATURE: 97.3 F | WEIGHT: 185 LBS | BODY MASS INDEX: 27.4 KG/M2

## 2021-09-01 DIAGNOSIS — M47.816 SPONDYLOSIS OF LUMBAR REGION WITHOUT MYELOPATHY OR RADICULOPATHY: ICD-10-CM

## 2021-09-01 DIAGNOSIS — M47.817 LUMBOSACRAL SPONDYLOSIS WITHOUT MYELOPATHY: ICD-10-CM

## 2021-09-01 PROCEDURE — G8419 CALC BMI OUT NRM PARAM NOF/U: HCPCS | Performed by: NURSE PRACTITIONER

## 2021-09-01 PROCEDURE — 99213 OFFICE O/P EST LOW 20 MIN: CPT | Performed by: NURSE PRACTITIONER

## 2021-09-01 PROCEDURE — 4004F PT TOBACCO SCREEN RCVD TLK: CPT | Performed by: NURSE PRACTITIONER

## 2021-09-01 PROCEDURE — G8427 DOCREV CUR MEDS BY ELIG CLIN: HCPCS | Performed by: NURSE PRACTITIONER

## 2021-09-01 RX ORDER — TIZANIDINE 2 MG/1
2 TABLET ORAL 2 TIMES DAILY PRN
Qty: 60 TABLET | Refills: 0 | Status: SHIPPED | OUTPATIENT
Start: 2021-09-01 | End: 2021-09-29

## 2021-09-01 RX ORDER — GABAPENTIN 600 MG/1
600 TABLET ORAL 4 TIMES DAILY
Qty: 120 TABLET | Refills: 0 | Status: SHIPPED | OUTPATIENT
Start: 2021-09-01 | End: 2021-09-28 | Stop reason: SDUPTHER

## 2021-09-01 ASSESSMENT — ENCOUNTER SYMPTOMS
COLOR CHANGE: 0
BACK PAIN: 1
ABDOMINAL PAIN: 0
RESPIRATORY NEGATIVE: 1

## 2021-09-01 NOTE — PROGRESS NOTES
Patient: Miguel Angel Krueger  YOB: 1980  Date: 9/1/21        Subjective:     Miguel Angel Krueger is a 35 y.o. male who complains today of:    Chief Complaint   Patient presents with    Back Pain         Allergies:  Patient has no known allergies. Past Medical History:   Diagnosis Date    Chronic back pain      Past Surgical History:   Procedure Laterality Date    HERNIA REPAIR      NECK SURGERY      tumor removed from neck     Family History   Problem Relation Age of Onset    Heart Disease Mother      Social History     Socioeconomic History    Marital status: Single     Spouse name: Not on file    Number of children: Not on file    Years of education: Not on file    Highest education level: Not on file   Occupational History    Not on file   Tobacco Use    Smoking status: Current Every Day Smoker     Types: Cigarettes    Smokeless tobacco: Never Used   Substance and Sexual Activity    Alcohol use: Yes     Alcohol/week: 0.0 standard drinks     Comment: RARELY    Drug use: Not on file    Sexual activity: Not on file   Other Topics Concern    Not on file   Social History Narrative    Not on file     Social Determinants of Health     Financial Resource Strain:     Difficulty of Paying Living Expenses:    Food Insecurity:     Worried About Running Out of Food in the Last Year:     [address] in the Last Year:    Transportation Needs:     Lack of Transportation (Medical):      Lack of Transportation (Non-Medical):    Physical Activity:     Days of Exercise per Week:     Minutes of Exercise per Session:    Stress:     Feeling of Stress :    Social Connections:     Frequency of Communication with Friends and Family:     Frequency of Social Gatherings with Friends and Family:     Attends Denominational Services:     Active Member of Clubs or Organizations:     Attends Club or Organization Meetings:     Marital Status:    Intimate Partner Violence:     Fear of Current or Ex-Partner:     Emotionally Abused:     Physically Abused:     Sexually Abused:        Current Outpatient Medications on File Prior to Visit   Medication Sig Dispense Refill    diclofenac sodium (VOLTAREN) 1 % GEL APPLY 4 GRAMS TOPICALLY TO KNEE JOINT 4 TIMES A DAY AS NEEDED FOR PAIN 500 g 1    meloxicam (MOBIC) 7.5 MG tablet Take 2 tablets by mouth daily 30 tablet 0    VENTOLIN  (90 Base) MCG/ACT inhaler INHALE 2 PUFFS EVERY 4 TO 6 HOURS AS NEEDED  2    atomoxetine (STRATTERA) 80 MG capsule TAKE ONE CAPSULE BY MOUTH EVERY DAY IN THE MORNING (Patient not taking: Reported on 9/1/2021)  2    hydrOXYzine (VISTARIL) 50 MG capsule  (Patient not taking: Reported on 9/1/2021)      buPROPion (WELLBUTRIN SR) 100 MG SR tablet  (Patient not taking: Reported on 9/1/2021)      atomoxetine (STRATTERA) 40 MG capsule Take 40 mg by mouth daily (Patient not taking: Reported on 9/1/2021)       No current facility-administered medications on file prior to visit. Pt presents today for a f/u of his chronic  low back pain. Pain today not too bad. Has lumbar MRI schedule Sept 10. EMG lower extremities done June 30, 2021 was normal.   He has not started physical therapy due to transportation. He is going to be taking his children to a water park soon. Had consult with Dr Hoa Gleason and had right shoulder pain. He has no follow-ups because his shoulder has been okay. He status post radiofrequency ablation lumbar right L2 L3-L4-L5 on 1/19/2021 and left lumbar L2 L3-L4-L5 on 2/10/2021 with greater than 80% pain relief. He does not get opioids due to previous history of marijuana use. Pt feels pain level is 5/10 without medication. Pt feels that hot weather makes the pain worse, and swimming makes the pain better. Pt feels his medication helps him function and improve his quality of life. Pt denies radiating numbness and tingling. Denies recent falls, injuries or trauma.   Pt denies new weakness.         Back Pain  Pertinent negatives include no abdominal pain, headaches, numbness or weakness. Review of Systems   Constitutional: Negative. Negative for activity change and unexpected weight change. HENT: Negative. Respiratory: Negative. Gastrointestinal: Negative for abdominal pain. Genitourinary: Negative. Musculoskeletal: Positive for back pain. Negative for gait problem, joint swelling, neck pain and neck stiffness. Skin: Negative for color change and rash. Neurological: Negative. Negative for dizziness, seizures, weakness, light-headedness, numbness and headaches. Psychiatric/Behavioral: Negative. Negative for sleep disturbance. Objective:     Vitals:  /68 (Site: Right Upper Arm)   Temp 97.3 °F (36.3 °C) (Temporal)   Ht 5' 9\" (1.753 m)   Wt 185 lb (83.9 kg)   BMI 27.32 kg/m²      Physical Exam  Vitals reviewed. Constitutional:       Appearance: He is well-developed. HENT:      Head: Normocephalic. Nose: Nose normal.   Pulmonary:      Effort: Pulmonary effort is normal.   Musculoskeletal:      Cervical back: Normal range of motion and neck supple. Lumbar back: Tenderness present. Decreased range of motion. Negative right straight leg raise test and negative left straight leg raise test.      Comments: TTP right low back   Lymphadenopathy:      Cervical: No cervical adenopathy. Skin:     General: Skin is warm and dry. Findings: No erythema or rash. Neurological:      Mental Status: He is alert and oriented to person, place, and time. Cranial Nerves: No cranial nerve deficit. Motor: No weakness. Gait: Gait normal.      Deep Tendon Reflexes: Reflexes are normal and symmetric. Reflexes normal.   Psychiatric:         Mood and Affect: Mood normal.         Behavior: Behavior normal.         Thought Content: Thought content normal.         Judgment: Judgment normal.            Assessment:        Diagnosis Orders   1.  Lumbosacral spondylosis without myelopathy  gabapentin (NEURONTIN) 600 MG tablet    tiZANidine (ZANAFLEX) 2 MG tablet   2. Spondylosis of lumbar region without myelopathy or radiculopathy  diclofenac sodium (VOLTAREN) 1 % GEL       Plan:          Orders Placed This Encounter   Medications    gabapentin (NEURONTIN) 600 MG tablet     Sig: Take 1 tablet by mouth 4 times daily for 30 days. Dispense:  120 tablet     Refill:  0    tiZANidine (ZANAFLEX) 2 MG tablet     Sig: Take 1 tablet by mouth 2 times daily as needed (muscle spasm)     Dispense:  60 tablet     Refill:  0    diclofenac sodium (VOLTAREN) 1 % GEL     Sig: Apply 2 g topically 4 times daily as needed for Pain     Dispense:  100 g     Refill:  0       No orders of the defined types were placed in this encounter.    -refill medications  -MRI to be done 9/10/21    Follow up:  Return in about 4 weeks (around 9/29/2021) for review meds and reassess pain.     LOUIE Crabtree - CNP

## 2021-09-10 ENCOUNTER — HOSPITAL ENCOUNTER (OUTPATIENT)
Dept: MRI IMAGING | Age: 41
Discharge: HOME | End: 2021-09-12
Payer: COMMERCIAL

## 2021-09-10 DIAGNOSIS — M79.604 RIGHT LEG PAIN: ICD-10-CM

## 2021-09-10 DIAGNOSIS — M47.816 SPONDYLOSIS OF LUMBAR REGION WITHOUT MYELOPATHY OR RADICULOPATHY: ICD-10-CM

## 2021-09-10 DIAGNOSIS — M47.817 LUMBOSACRAL SPONDYLOSIS WITHOUT MYELOPATHY: ICD-10-CM

## 2021-09-10 PROCEDURE — 72148 MRI LUMBAR SPINE W/O DYE: CPT

## 2021-09-28 DIAGNOSIS — M47.817 LUMBOSACRAL SPONDYLOSIS WITHOUT MYELOPATHY: ICD-10-CM

## 2021-09-28 NOTE — TELEPHONE ENCOUNTER
Requested Prescriptions     Pending Prescriptions Disp Refills    gabapentin (NEURONTIN) 600 MG tablet 120 tablet 0     Sig: Take 1 tablet by mouth 4 times daily for 30 days.  tiZANidine (ZANAFLEX) 2 MG tablet 60 tablet 0     Sig: Take 1 tablet by mouth 2 times daily as needed (muscle spasm)       Patient last seen on:  9/1  Date of last surgery:  N/A  Date of last refill:  9/1  Pain level:  N/A  Patient complaining of:  Patient rs'ed and asking for refill until next appt.   Future appts: 10/19

## 2021-09-29 RX ORDER — GABAPENTIN 600 MG/1
600 TABLET ORAL 4 TIMES DAILY
Qty: 72 TABLET | Refills: 0 | Status: SHIPPED | OUTPATIENT
Start: 2021-10-01 | End: 2021-10-19 | Stop reason: SDUPTHER

## 2021-09-29 RX ORDER — TIZANIDINE 2 MG/1
2 TABLET ORAL 2 TIMES DAILY PRN
Qty: 36 TABLET | Refills: 0 | OUTPATIENT
Start: 2021-10-01 | End: 2021-10-19

## 2021-10-19 DIAGNOSIS — M47.817 LUMBOSACRAL SPONDYLOSIS WITHOUT MYELOPATHY: ICD-10-CM

## 2021-10-19 NOTE — TELEPHONE ENCOUNTER
Requested Prescriptions     Pending Prescriptions Disp Refills    gabapentin (NEURONTIN) 600 MG tablet 24 tablet 0     Sig: Take 1 tablet by mouth 4 times daily for 6 days.  tiZANidine (ZANAFLEX) 2 MG tablet 6 tablet 0     Sig: Take 1 tablet by mouth nightly as needed (pain)       Patient last seen on:  9/1  Date of last surgery:  n/a  Date of last refill:  10/1  Pain level:  n/a  Patient complaining of:  Pt rs'ed and asking for refill until next appt.    Future appts: 10/25

## 2021-10-20 RX ORDER — TIZANIDINE 2 MG/1
2 TABLET ORAL NIGHTLY PRN
Qty: 6 TABLET | Refills: 0 | Status: SHIPPED | OUTPATIENT
Start: 2021-10-20 | End: 2021-10-25 | Stop reason: SDUPTHER

## 2021-10-20 RX ORDER — GABAPENTIN 600 MG/1
600 TABLET ORAL 4 TIMES DAILY
Qty: 24 TABLET | Refills: 0 | Status: SHIPPED | OUTPATIENT
Start: 2021-10-20 | End: 2021-10-25 | Stop reason: SDUPTHER

## 2021-10-25 ENCOUNTER — OFFICE VISIT (OUTPATIENT)
Dept: PAIN MANAGEMENT | Age: 41
End: 2021-10-25
Payer: COMMERCIAL

## 2021-10-25 VITALS
HEIGHT: 69 IN | DIASTOLIC BLOOD PRESSURE: 76 MMHG | SYSTOLIC BLOOD PRESSURE: 134 MMHG | TEMPERATURE: 97.3 F | BODY MASS INDEX: 27.25 KG/M2 | WEIGHT: 184 LBS

## 2021-10-25 DIAGNOSIS — M47.816 SPONDYLOSIS OF LUMBAR REGION WITHOUT MYELOPATHY OR RADICULOPATHY: ICD-10-CM

## 2021-10-25 DIAGNOSIS — M54.16 LUMBAR RADICULOPATHY: ICD-10-CM

## 2021-10-25 DIAGNOSIS — M48.061 LUMBAR FORAMINAL STENOSIS: Primary | ICD-10-CM

## 2021-10-25 DIAGNOSIS — M47.817 LUMBOSACRAL SPONDYLOSIS WITHOUT MYELOPATHY: ICD-10-CM

## 2021-10-25 PROCEDURE — G8484 FLU IMMUNIZE NO ADMIN: HCPCS | Performed by: NURSE PRACTITIONER

## 2021-10-25 PROCEDURE — G8419 CALC BMI OUT NRM PARAM NOF/U: HCPCS | Performed by: NURSE PRACTITIONER

## 2021-10-25 PROCEDURE — G8427 DOCREV CUR MEDS BY ELIG CLIN: HCPCS | Performed by: NURSE PRACTITIONER

## 2021-10-25 PROCEDURE — 4004F PT TOBACCO SCREEN RCVD TLK: CPT | Performed by: NURSE PRACTITIONER

## 2021-10-25 PROCEDURE — 99214 OFFICE O/P EST MOD 30 MIN: CPT | Performed by: NURSE PRACTITIONER

## 2021-10-25 RX ORDER — TIZANIDINE 2 MG/1
2 TABLET ORAL 2 TIMES DAILY PRN
Qty: 60 TABLET | Refills: 0 | Status: SHIPPED | OUTPATIENT
Start: 2021-10-25 | End: 2021-11-22 | Stop reason: SDUPTHER

## 2021-10-25 RX ORDER — GABAPENTIN 600 MG/1
600 TABLET ORAL 4 TIMES DAILY
Qty: 120 TABLET | Refills: 0 | Status: SHIPPED | OUTPATIENT
Start: 2021-10-25 | End: 2021-11-22 | Stop reason: SDUPTHER

## 2021-10-25 ASSESSMENT — ENCOUNTER SYMPTOMS
COUGH: 0
EYES NEGATIVE: 1
BACK PAIN: 1
GASTROINTESTINAL NEGATIVE: 1
DIARRHEA: 0
NAUSEA: 0
SHORTNESS OF BREATH: 0
CONSTIPATION: 0

## 2021-10-25 NOTE — PROGRESS NOTES
Organization Meetings:     Marital Status:    Intimate Partner Violence:     Fear of Current or Ex-Partner:     Emotionally Abused:     Physically Abused:     Sexually Abused:        Current Outpatient Medications on File Prior to Visit   Medication Sig Dispense Refill    diclofenac sodium (VOLTAREN) 1 % GEL APPLY 4 GRAMS TOPICALLY TO KNEE JOINT 4 TIMES A DAY AS NEEDED FOR PAIN 500 g 1    meloxicam (MOBIC) 7.5 MG tablet Take 2 tablets by mouth daily 30 tablet 0    VENTOLIN  (90 Base) MCG/ACT inhaler INHALE 2 PUFFS EVERY 4 TO 6 HOURS AS NEEDED  2    atomoxetine (STRATTERA) 80 MG capsule TAKE ONE CAPSULE BY MOUTH EVERY DAY IN THE MORNING (Patient not taking: Reported on 9/1/2021)  2    hydrOXYzine (VISTARIL) 50 MG capsule  (Patient not taking: Reported on 9/1/2021)      buPROPion (WELLBUTRIN SR) 100 MG SR tablet  (Patient not taking: Reported on 9/1/2021)      atomoxetine (STRATTERA) 40 MG capsule Take 40 mg by mouth daily (Patient not taking: Reported on 9/1/2021)       No current facility-administered medications on file prior to visit. Pt presents today for a f/u of his pain. PCP is Dr. Jackie Whitaker MD and says changing doctors. He reports he tried to give plasma and says \"she poked me wrong\" and I couldn't breathe. Says has to discuss with PCP. He says he is having more Rt sided low back pain and feels more swollen. Pt last saw Deonte Decker CNP for chronic low back pain. Lumbar MRI report L4-5 moderate to severe right foraminal stenosis worsened since 2015; slightly increased bilateral renal cysts since 2015; other mild degenerative changes throughout. EMG lower extremities done June 30, 2021 was normal.   He has not started physical therapy due to transportation. He is going to be taking his children to a water park soon. Had consult with Dr Jayce Schuster and had right shoulder pain. He has no follow-ups because his shoulder has been okay.   He status post radiofrequency ablation lumbar right L2 L3-L4-L5 on 1/19/2021 and left lumbar L2 L3-L4-L5 on 2/10/2021 with greater than 80% pain relief. He has a knee brace but feels this makes his pain worse. He does not get opioids due to previous history of marijuana use which he continues to do - considering medical marijuana. Takes gabapentin 600mg Q6hrs, zanaflex 2mg BID and voltaren gel. Pt feels pain level 9/10. Pt feels that cleaning house, helping with uncle's care makes the pain worse, and medication, marijuana makes the pain better. Pt feels his medication helps   him function and improve his quality of life. Pt admits to Rt LE radiating numbness and tingling. Denies recent falls, injuries or trauma. Pt denies new weakness. Pt reports PT has been tried in the past.         Review of Systems   Constitutional: Negative for fever. HENT: Negative. Eyes: Negative. Respiratory: Negative for cough and shortness of breath. Cardiovascular: Negative for chest pain. Gastrointestinal: Negative. Negative for constipation, diarrhea and nausea. Endocrine: Negative. Genitourinary: Negative. Musculoskeletal: Positive for arthralgias and back pain. Negative for neck pain. Skin: Negative. Neurological: Positive for numbness. Negative for dizziness and weakness. Psychiatric/Behavioral: Negative. Reviewed Dr. Lona Zambrano notes and reports from 6/7/21:  XR R knee 11/8/2018: No fracture. Joint spaces preserved. XR L-spine 11/8/2018: Degenerative disease and facet arthropathy. No fracture. CT C-spine 2/24/2020: Facet arthropathy. No high-grade neural foraminal or spinal canal stenosis. No fracture. XR R shoulder 2/24/2020: No fracture. No acute osseous abnormality  XR R knee 2/24/2020: No fracture, negative right knee  XR C Spine 6/27/17: degenerative disc disease and facet arthropathy, no fracture. MRI R Knee 3/7/17: horizontal tear posterior horn of medial meniscus.    XR LS Spine 8/10/16: 5 lumbar vertebrae, no fracture, min degen changes. XR LS SPine 1/13/15: narrowing L4/5 level. Minimal degen changes  MRI LS Spine 3/13/15: disc narrowing and bulging at L4/5, degenerative changes also noted at L5/S1. EMG B LE 3/1/16: This study is normal. There is no current electrodiagnostic evidence for active lumbosacral motor radiculopathy, lumbosacral plexopathy, or generalized large fiber sensorimotor peripheral polyneuropathy. -Methocarbamol Robaxin 1500mg BID, Gabapentin 600mg QID min relief. Min relief Meloxicam 15mg. side effects Tizanidine 2mg. Min relief Magnesium 400mg daily. EMLA oint min relief.   Objective:     Vitals:  /76   Temp 97.3 °F (36.3 °C) (Infrared)   Ht 5' 9\" (1.753 m)   Wt 184 lb (83.5 kg)   BMI 27.17 kg/m² Pain Score:  10 - Worst pain ever      Physical Exam  Vitals and nursing note reviewed. This is a pleasant male who answers questions appropriately and follows commands. Pt is alert and oriented x 3. Recent and remote memory is intact. Mood and affect, judgement and insight are normal.  No signs of distress, no dyspnea or SOB noted. HEENT: PERRL. Neck is supple, trachea midline. No lymphadenopathy noted. Decreased ROM with flexion and extension of low back. Mild tenderness with palpation to lumbar spine with palpation > on right. Positive SLR on Rt. Tightness in both hamstrings noted. Pt is able to briefly heel walk and toe walk. Balance and coordination normal.  Strength is functional for ambulation. Cranial nerves II-XII are intact. Assessment:      Diagnosis Orders   1. Lumbar foraminal stenosis  [place] NJX AA&/STRD TFRML EPI LUMBAR/SACRAL 1 LEVEL    CHG FLUOR NEEDLE/CATH SPINE/PARASPINAL DX/THER ADDON   2. Lumbar radiculopathy  [place] NJX AA&/STRD TFRML EPI LUMBAR/SACRAL 1 LEVEL    CHG FLUOR NEEDLE/CATH SPINE/PARASPINAL DX/THER ADDON   3. Lumbosacral spondylosis without myelopathy  gabapentin (NEURONTIN) 600 MG tablet   4.  Spondylosis of lumbar region without myelopathy or radiculopathy  diclofenac sodium (VOLTAREN) 1 % GEL       Plan:          Orders Placed This Encounter   Medications    gabapentin (NEURONTIN) 600 MG tablet     Sig: Take 1 tablet by mouth 4 times daily for 30 days. Dispense:  120 tablet     Refill:  0    tiZANidine (ZANAFLEX) 2 MG tablet     Sig: Take 1 tablet by mouth 2 times daily as needed (pain)     Dispense:  60 tablet     Refill:  0    diclofenac sodium (VOLTAREN) 1 % GEL     Sig: Apply 2 g topically 4 times daily as needed for Pain     Dispense:  100 g     Refill:  0       Orders Placed This Encounter   Procedures    CHG FLUOR NEEDLE/CATH SPINE/PARASPINAL DX/THER ADDON     Standing Status:   Future     Standing Expiration Date:   1/23/2022    [place] NJX AA&/STRD TFRML EPI LUMBAR/SACRAL 1 LEVEL     Rt L4-5 darien with SM     Standing Status:   Future     Standing Expiration Date:   1/23/2022     Discussed options with the patient today. Anatomic model pathology was shown and reviewed with pt. Will order Rt L4-5 darien with Dr. Santiago Patel to help settle Rt LE pain - reviewed MRI report showing significant foraminal stenosis. Discussed if no relief consider neurosurgery consult. He agrees with plan. All questions were answered. Discussed home exercise program and  smoking cessation. Continue gabapentin 600mg Q6hrs, zanaflex 2mg BID and voltaren gel as previously directed. No narcotics. Relevant imaging and pain generators reviewed. Pt verbalized understanding and agrees with above plan. Pt has chronic pain. Will continue medications for chronic pain that has been previously directed as they do help pt function with ADL and improve quality of life. OARRS was reviewed. This NP saw pt under direct supervision of Dr. Santiago Patel. Follow up:  Return in about 4 weeks (around 11/22/2021) for f/u after procedure and reassess pain/medications.     Leola Garcia, APRN - CNP

## 2021-10-26 ENCOUNTER — TELEPHONE (OUTPATIENT)
Dept: PAIN MANAGEMENT | Age: 41
End: 2021-10-26

## 2021-10-26 NOTE — TELEPHONE ENCOUNTER
ORDER PLACED:    Date: 10/25/21  Description: RIGHT L4-5 AYUSH  Order Number: [id number]  Ordering Provider: Leann Tate Provider: [facility]  CPT Codes: 23494  ICD10 Codes: M54.16    ORDER SENT TO Veto Gilliland TO Aster Barriga

## 2021-10-27 NOTE — TELEPHONE ENCOUNTER
CASE STARTED ONLINE VIA [facility] PROVIDER PORTAL   PENDED FOR REVIEW  CLINCIAL UPLOADED  REF # [id number]

## 2021-11-08 NOTE — TELEPHONE ENCOUNTER
RIGHT L4-5 AYUSH    AUTH APPROVED FROM 11/2/211-12/31/21    OK to schedule procedure approved as above. Please note sides/levels approved and date range. (If applicable, sides/levels approved may differ from those ordered)    TO BE SCHEDULED WITH DR. Manrique Ahr

## 2021-11-08 NOTE — TELEPHONE ENCOUNTER
AUTHORIZATION:    INSURANCE: Kenny Campa Formerly named [facility] VIA: PORTAL    AUTH #: [id number]    DATE RANGE: 11/2/21-12/31/21    TELEPHONE CALL ROUTED TO MA TO SCHEDULE.

## 2021-11-16 ENCOUNTER — PROCEDURE VISIT (OUTPATIENT)
Dept: PAIN MANAGEMENT | Age: 41
End: 2021-11-16
Payer: COMMERCIAL

## 2021-11-16 DIAGNOSIS — M48.061 LUMBAR FORAMINAL STENOSIS: ICD-10-CM

## 2021-11-16 DIAGNOSIS — M54.16 LUMBAR RADICULOPATHY: ICD-10-CM

## 2021-11-16 PROCEDURE — 64483 NJX AA&/STRD TFRM EPI L/S 1: CPT | Performed by: PAIN MEDICINE

## 2021-11-16 RX ORDER — DEXAMETHASONE SODIUM PHOSPHATE 10 MG/ML
10 INJECTION, EMULSION INTRAMUSCULAR; INTRAVENOUS ONCE
Status: COMPLETED | OUTPATIENT
Start: 2021-11-16 | End: 2021-11-16

## 2021-11-16 RX ORDER — SODIUM CHLORIDE 9 MG/ML
10 INJECTION INTRAVENOUS ONCE
Status: COMPLETED | OUTPATIENT
Start: 2021-11-16 | End: 2021-11-16

## 2021-11-16 RX ADMIN — SODIUM CHLORIDE 10 ML: 9 INJECTION INTRAVENOUS at 11:03

## 2021-11-16 RX ADMIN — DEXAMETHASONE SODIUM PHOSPHATE 10 MG: 10 INJECTION, EMULSION INTRAMUSCULAR; INTRAVENOUS at 11:05

## 2021-11-16 NOTE — PROGRESS NOTES
[facility].  Spine Surgery  Advanced Pain Management      Patient Name: Vilma Read : 1980  Date: 2021   Physician: Carmine Saeed, [address] [place] [address]  is here today for interventional pain management. Preoperatively, the patient presents with radicular pain in the affected area as per history and exam. Patient has failed NSAIDs, PT, and conservative treatment. Patient has significant psychological and functional impairment due to this condition. Standard ASIPP guidelines were followed and sterile technique used. Area was cleaned with Betadine x3. Informed consent was obtained. Fluoroscopic guidance was used for this procedure. TRANSFORAMINAL EPIDURAL  There was appropriate spread of contrast in the anterior epidural space and around the exiting nerve root. The 6 oclock position of the pedicle was identified. Multiple views of fluoroscopy including lateral were used to confirm accurate needle placement of depth. Live fluoroscopy was used when injecting contrast to ensure no subdural or vascular spread. In total, approximately 5 mg of Dexamethasone and 1.0 ml of 0.9cc of normal saline was injected slowly without difficulty. Patient tolerated the procedure well, no obvious complications occurred during the procedure. Patient was appropriately monitored and discharged home in stable condition with their usual motor strength. Post Op instructions were given to patient. Patient will resume blood thinners after procedure if they stopped them before procedure. Relevant imaging was reviewed with patient. [] Bilateral [] T12-L1 [] L3-4        [] L1-2 [x] L4-5       [x] Right [] L2-3 [] L5-S1                [] Left                  Carmine Saeed DO      [facility] [address]  Phone [phone]/AYG [email or web address]

## 2021-11-22 DIAGNOSIS — M47.817 LUMBOSACRAL SPONDYLOSIS WITHOUT MYELOPATHY: ICD-10-CM

## 2021-11-22 DIAGNOSIS — M47.816 SPONDYLOSIS OF LUMBAR REGION WITHOUT MYELOPATHY OR RADICULOPATHY: ICD-10-CM

## 2021-11-22 NOTE — TELEPHONE ENCOUNTER
Requested Prescriptions     Pending Prescriptions Disp Refills    gabapentin (NEURONTIN) 600 MG tablet 120 tablet 0     Sig: Take 1 tablet by mouth 4 times daily for 30 days.  tiZANidine (ZANAFLEX) 2 MG tablet 60 tablet 0     Sig: Take 1 tablet by mouth 2 times daily as needed (pain)    diclofenac sodium (VOLTAREN) 1 %  g 0     Sig: Apply 2 g topically 4 times daily as needed for Pain       Patient last seen on:  11/16  Date of last surgery:  n/a  Date of last refill:  10/25  Pain level:  n/a  Patient complaining of:  Pt asking for refill.    Future appts: 12/21

## 2021-11-23 RX ORDER — TIZANIDINE 2 MG/1
2 TABLET ORAL 2 TIMES DAILY PRN
Qty: 60 TABLET | Refills: 0 | Status: SHIPPED | OUTPATIENT
Start: 2021-11-24 | End: 2021-12-28 | Stop reason: SDUPTHER

## 2021-11-23 RX ORDER — GABAPENTIN 600 MG/1
600 TABLET ORAL 4 TIMES DAILY
Qty: 120 TABLET | Refills: 0 | Status: SHIPPED | OUTPATIENT
Start: 2021-11-24 | End: 2021-12-28 | Stop reason: SDUPTHER

## 2021-12-28 ENCOUNTER — OFFICE VISIT (OUTPATIENT)
Dept: NEUROSURGERY | Age: 41
End: 2021-12-28
Payer: COMMERCIAL

## 2021-12-28 VITALS — TEMPERATURE: 96.9 F | BODY MASS INDEX: 27.4 KG/M2 | HEIGHT: 69 IN | WEIGHT: 185 LBS

## 2021-12-28 DIAGNOSIS — M47.816 SPONDYLOSIS OF LUMBAR REGION WITHOUT MYELOPATHY OR RADICULOPATHY: ICD-10-CM

## 2021-12-28 DIAGNOSIS — M47.817 LUMBOSACRAL SPONDYLOSIS WITHOUT MYELOPATHY: ICD-10-CM

## 2021-12-28 PROCEDURE — 99214 OFFICE O/P EST MOD 30 MIN: CPT | Performed by: NURSE PRACTITIONER

## 2021-12-28 PROCEDURE — G8419 CALC BMI OUT NRM PARAM NOF/U: HCPCS | Performed by: NURSE PRACTITIONER

## 2021-12-28 PROCEDURE — G8484 FLU IMMUNIZE NO ADMIN: HCPCS | Performed by: NURSE PRACTITIONER

## 2021-12-28 PROCEDURE — 4004F PT TOBACCO SCREEN RCVD TLK: CPT | Performed by: NURSE PRACTITIONER

## 2021-12-28 PROCEDURE — G8427 DOCREV CUR MEDS BY ELIG CLIN: HCPCS | Performed by: NURSE PRACTITIONER

## 2021-12-28 RX ORDER — TIZANIDINE 2 MG/1
2 TABLET ORAL 2 TIMES DAILY PRN
Qty: 60 TABLET | Refills: 0 | Status: SHIPPED | OUTPATIENT
Start: 2021-12-28 | End: 2022-01-27 | Stop reason: SDUPTHER

## 2021-12-28 RX ORDER — GABAPENTIN 600 MG/1
600 TABLET ORAL 4 TIMES DAILY
Qty: 120 TABLET | Refills: 0 | Status: SHIPPED | OUTPATIENT
Start: 2021-12-28 | End: 2022-01-27 | Stop reason: SDUPTHER

## 2021-12-28 ASSESSMENT — ENCOUNTER SYMPTOMS
BACK PAIN: 1
CONSTIPATION: 0
RESPIRATORY NEGATIVE: 1
DIARRHEA: 0

## 2021-12-28 NOTE — PROGRESS NOTES
Patient: Kailyn Munoz  YOB: 1980  Date: 12/28/21        Subjective:     Kailyn Munoz is a 35 y.o. male who complains today of:    Chief Complaint   Patient presents with    Back Pain    Knee Pain         Allergies:  Patient has no known allergies. Past Medical History:   Diagnosis Date    Chronic back pain      Past Surgical History:   Procedure Laterality Date    HERNIA REPAIR      NECK SURGERY      tumor removed from neck     Family History   Problem Relation Age of Onset    Heart Disease Mother      Social History     Socioeconomic History    Marital status: Single     Spouse name: Not on file    Number of children: Not on file    Years of education: Not on file    Highest education level: Not on file   Occupational History    Not on file   Tobacco Use    Smoking status: Current Every Day Smoker     Packs/day: 1.00     Years: 10.00     Pack years: 10.00     Types: Cigarettes    Smokeless tobacco: Never Used    Tobacco comment: patient stated he is trying to quit   Substance and Sexual Activity    Alcohol use: Yes     Alcohol/week: 0.0 standard drinks     Comment: RARELY    Drug use: Not on file    Sexual activity: Not on file   Other Topics Concern    Not on file   Social History Narrative    Not on file     Social Determinants of Health     Financial Resource Strain:     Difficulty of Paying Living Expenses: Not on file   Food Insecurity:     Worried About Running Out of Food in the Last Year: Not on file    Ximena of Food in the Last Year: Not on file   Transportation Needs:     Lack of Transportation (Medical): Not on file    Lack of Transportation (Non-Medical):  Not on file   Physical Activity:     Days of Exercise per Week: Not on file    Minutes of Exercise per Session: Not on file   Stress:     Feeling of Stress : Not on file   Social Connections:     Frequency of Communication with Friends and Family: Not on file    Frequency of Social Gatherings with Friends and Family: Not on file    Attends Episcopal Services: Not on file    Active Member of Clubs or Organizations: Not on file    Attends Club or Organization Meetings: Not on file    Marital Status: Not on file   Intimate Partner Violence:     Fear of Current or Ex-Partner: Not on file    Emotionally Abused: Not on file    Physically Abused: Not on file    Sexually Abused: Not on file   Housing Stability:     Unable to Pay for Housing in the Last Year: Not on file    Number of [place] in the Last Year: Not on file    Unstable Housing in the Last Year: Not on file       Current Outpatient Medications on File Prior to Visit   Medication Sig Dispense Refill    diclofenac sodium (VOLTAREN) 1 % GEL APPLY 4 GRAMS TOPICALLY TO KNEE JOINT 4 TIMES A DAY AS NEEDED FOR PAIN 500 g 1    meloxicam (MOBIC) 7.5 MG tablet Take 2 tablets by mouth daily 30 tablet 0    VENTOLIN  (90 Base) MCG/ACT inhaler INHALE 2 PUFFS EVERY 4 TO 6 HOURS AS NEEDED  2     No current facility-administered medications on file prior to visit. 11/16/21 right L4-5 with short term relief. C/o right LE pain from knee to foot. Pt presents today for a f/u of his chronic  low back pain. Had some family deaths and having panic attacks. Used to get meds from [facility] but not for a while and will go back to them in January. MRI lumbar spine 9/10/2021 report: L4-5 moderate to severe DDD worsening since 2015. Moderate to severe right foraminal stenosis. L5-S1 severe right facet arthrosis. Minimally changed since 2015. Normal bilateral renal cysts incompletely evaluated largest in the left kidney slightly increased since 2/24/2020 CT abdomen pelvis. C4-5 mild to moderate and C5-6 mild degenerative disc disease      EMG lower extremities done June 30, 2021 was normal.   He has not started physical therapy due to transportation. Had consult with Dr Ward Grimm and had right shoulder pain.    He has no follow-ups because his shoulder has been okay. He status post radiofrequency ablation lumbar right L2 L3-L4-L5 on 1/19/2021 and left lumbar L2 L3-L4-L5 on 2/10/2021 with greater than 80% pain relief. He would like to have this done again. He does not get opioids due to previous history of marijuana use. Pt feels pain level is 5/10 without medication. Pt feels that hot weather makes the pain worse, and swimming makes the pain better. Pt feels his medication helps him function and improve his quality of life. Pt denies radiating numbness and tingling. Denies recent falls, injuries or trauma. Pt denies new weakness. Back Pain  Pertinent negatives include no headaches, numbness or weakness. Knee Pain   Pertinent negatives include no numbness. Review of Systems   Constitutional: Negative. Negative for activity change and unexpected weight change. HENT: Negative. Negative for hearing loss. Respiratory: Negative. Cardiovascular: Negative for leg swelling. Gastrointestinal: Negative for constipation and diarrhea. Genitourinary: Negative. Musculoskeletal: Positive for back pain. Negative for gait problem, joint swelling and neck pain. Skin: Negative for rash. Neurological: Negative for dizziness, weakness, numbness and headaches. Psychiatric/Behavioral: Negative. Negative for sleep disturbance. Objective:     Vitals:  Temp 96.9 °F (36.1 °C)   Ht 5' 9\" (1.753 m)   Wt 185 lb (83.9 kg)   BMI 27.32 kg/m² Pain Score:   7    Physical Exam  Vitals reviewed. Constitutional:       Appearance: He is well-developed. HENT:      Head: Normocephalic. Nose: Nose normal.   Pulmonary:      Effort: Pulmonary effort is normal.   Musculoskeletal:      Cervical back: Normal range of motion and neck supple. Lumbar back: Tenderness present. Decreased range of motion.  Negative right straight leg raise test and negative left straight leg raise test.   Lymphadenopathy: Cervical: No cervical adenopathy. Skin:     General: Skin is warm and dry. Findings: No erythema or rash. Neurological:      Mental Status: He is alert and oriented to person, place, and time. Cranial Nerves: No cranial nerve deficit. Deep Tendon Reflexes: Reflexes are normal and symmetric. Reflexes normal.   Psychiatric:         Mood and Affect: Mood normal.         Behavior: Behavior normal.         Thought Content: Thought content normal.         Judgment: Judgment normal.            Assessment:        Diagnosis Orders   1. Lumbosacral spondylosis without myelopathy  gabapentin (NEURONTIN) 600 MG tablet    OR RADIOFREQUENCY NEUROTOMY LUMBAR OR SACRAL, W IMAGE GUIDANCE, SINGLE   2. Spondylosis of lumbar region without myelopathy or radiculopathy  diclofenac sodium (VOLTAREN) 1 % GEL       Plan:          Orders Placed This Encounter   Medications    gabapentin (NEURONTIN) 600 MG tablet     Sig: Take 1 tablet by mouth 4 times daily for 30 days. Dispense:  120 tablet     Refill:  0    diclofenac sodium (VOLTAREN) 1 % GEL     Sig: Apply 2 g topically 4 times daily as needed for Pain     Dispense:  100 g     Refill:  0    tiZANidine (ZANAFLEX) 2 MG tablet     Sig: Take 1 tablet by mouth 2 times daily as needed (pain)     Dispense:  60 tablet     Refill:  0       Orders Placed This Encounter   Procedures    OR RADIOFREQUENCY NEUROTOMY LUMBAR OR SACRAL, W IMAGE GUIDANCE, SINGLE     RFA bilateral L345 with SM     Standing Status:   Future     Standing Expiration Date:   3/28/2022     -He has a new PCP at [address] [place] [address]. I gave a copy of his MRI to give to his doctor to order ultrasound for kidney cysts. -We will go ahead and order  bilateral L3, L4, L5 RF ablation with Dr Thao Alvarado as pt has had >80% pain relief with diagnostic MBB's and improvement with past RF ablations. he has failed conservative treatment in the past. Anatomic model of pathology was shown.  Risks and benefits of the procedure were discussed. All questions were answered and patient understands and agrees with the plan. -Refill gabapentin 600 mg 4 times daily, #120  -Refill tizanidine 2 mg twice daily as needed spasm, #60    Seen under direct supervision of Dr. Quan Jha    Follow up:  Return in about 4 weeks (around 1/25/2022) for review meds and reassess pain.     Delgado Clark, LOUIE - CNP

## 2022-01-03 ENCOUNTER — TELEPHONE (OUTPATIENT)
Dept: PAIN MANAGEMENT | Age: 42
End: 2022-01-03

## 2022-01-03 NOTE — TELEPHONE ENCOUNTER
ORDER PLACED:    Date: 12/28/21  Description: BILAT L3,4,5 RFA  Order Number: [id number]  Ordering Provider: Mitch Yañez  Performing Provider: Malinda Sanchez  CPT Codes: 58083,01885  ICD10 Codes: U54.325

## 2022-01-12 NOTE — TELEPHONE ENCOUNTER
BILAT L3,4,5 RFA    AUTH FROM 1/4/22-3/4/22    OK to schedule procedure approved as above. Please note sides/levels approved and date range. (If applicable, sides/levels approved may differ from those ordered)    TO BE SCHEDULED WITH DR. Blanquita Pandya

## 2022-01-27 ENCOUNTER — OFFICE VISIT (OUTPATIENT)
Dept: PAIN MANAGEMENT | Age: 42
End: 2022-01-27
Payer: COMMERCIAL

## 2022-01-27 VITALS
TEMPERATURE: 97.6 F | DIASTOLIC BLOOD PRESSURE: 76 MMHG | SYSTOLIC BLOOD PRESSURE: 128 MMHG | BODY MASS INDEX: 27.4 KG/M2 | WEIGHT: 185 LBS | HEIGHT: 69 IN

## 2022-01-27 DIAGNOSIS — M47.817 LUMBOSACRAL SPONDYLOSIS WITHOUT MYELOPATHY: Primary | ICD-10-CM

## 2022-01-27 DIAGNOSIS — M47.816 SPONDYLOSIS OF LUMBAR REGION WITHOUT MYELOPATHY OR RADICULOPATHY: ICD-10-CM

## 2022-01-27 PROCEDURE — 99214 OFFICE O/P EST MOD 30 MIN: CPT | Performed by: NURSE PRACTITIONER

## 2022-01-27 PROCEDURE — G8419 CALC BMI OUT NRM PARAM NOF/U: HCPCS | Performed by: NURSE PRACTITIONER

## 2022-01-27 PROCEDURE — G8427 DOCREV CUR MEDS BY ELIG CLIN: HCPCS | Performed by: NURSE PRACTITIONER

## 2022-01-27 PROCEDURE — 4004F PT TOBACCO SCREEN RCVD TLK: CPT | Performed by: NURSE PRACTITIONER

## 2022-01-27 PROCEDURE — G8484 FLU IMMUNIZE NO ADMIN: HCPCS | Performed by: NURSE PRACTITIONER

## 2022-01-27 RX ORDER — GABAPENTIN 600 MG/1
600 TABLET ORAL 4 TIMES DAILY
Qty: 360 TABLET | Refills: 0 | Status: SHIPPED | OUTPATIENT
Start: 2022-01-27 | End: 2022-04-26 | Stop reason: SDUPTHER

## 2022-01-27 RX ORDER — TIZANIDINE 2 MG/1
2 TABLET ORAL 2 TIMES DAILY PRN
Qty: 180 TABLET | Refills: 0 | Status: SHIPPED | OUTPATIENT
Start: 2022-01-27 | End: 2022-04-26 | Stop reason: SDUPTHER

## 2022-01-27 ASSESSMENT — ENCOUNTER SYMPTOMS
BLOOD IN STOOL: 0
BACK PAIN: 1
SHORTNESS OF BREATH: 0

## 2022-01-27 NOTE — PROGRESS NOTES
Shiloh Viera  ([id number])    1/27/2022    Subjective:     Shiloh Viera is 35 y.o. male who complains today of:    Chief Complaint   Patient presents with    Back Pain         Allergies:  Patient has no known allergies. Past Medical History:   Diagnosis Date    Chronic back pain      Past Surgical History:   Procedure Laterality Date    HERNIA REPAIR      NECK SURGERY      tumor removed from neck     Family History   Problem Relation Age of Onset    Heart Disease Mother      Social History     Socioeconomic History    Marital status: Single     Spouse name: Not on file    Number of children: Not on file    Years of education: Not on file    Highest education level: Not on file   Occupational History    Not on file   Tobacco Use    Smoking status: Current Every Day Smoker     Packs/day: 1.00     Years: 10.00     Pack years: 10.00     Types: Cigarettes    Smokeless tobacco: Never Used    Tobacco comment: patient stated he is trying to quit   Substance and Sexual Activity    Alcohol use: Yes     Alcohol/week: 0.0 standard drinks     Comment: RARELY    Drug use: Not on file    Sexual activity: Not on file   Other Topics Concern    Not on file   Social History Narrative    Not on file     Social Determinants of Health     Financial Resource Strain:     Difficulty of Paying Living Expenses: Not on file   Food Insecurity:     Worried About Running Out of Food in the Last Year: Not on file    Ximena of Food in the Last Year: Not on file   Transportation Needs:     Lack of Transportation (Medical): Not on file    Lack of Transportation (Non-Medical):  Not on file   Physical Activity:     Days of Exercise per Week: Not on file    Minutes of Exercise per Session: Not on file   Stress:     Feeling of Stress : Not on file   Social Connections:     Frequency of Communication with Friends and Family: Not on file    Frequency of Social Gatherings with Friends and Family: Not on file  Attends Voodoo Services: Not on file    Active Member of Clubs or Organizations: Not on file    Attends Club or Organization Meetings: Not on file    Marital Status: Not on file   Intimate Partner Violence:     Fear of Current or Ex-Partner: Not on file    Emotionally Abused: Not on file    Physically Abused: Not on file    Sexually Abused: Not on file   Housing Stability:     Unable to Pay for Housing in the Last Year: Not on file    Number of [place] in the Last Year: Not on file    Unstable Housing in the Last Year: Not on file       Current Outpatient Medications on File Prior to Visit   Medication Sig Dispense Refill    diclofenac sodium (VOLTAREN) 1 % GEL APPLY 4 GRAMS TOPICALLY TO KNEE JOINT 4 TIMES A DAY AS NEEDED FOR PAIN 500 g 1    meloxicam (MOBIC) 7.5 MG tablet Take 2 tablets by mouth daily 30 tablet 0    VENTOLIN  (90 Base) MCG/ACT inhaler INHALE 2 PUFFS EVERY 4 TO 6 HOURS AS NEEDED  2     No current facility-administered medications on file prior to visit. Pt presents today for a f/u of chronic low back pain. He is upset over the recent passing of 2 aunts, has been having worsened anxiety and nightmares. He plans to see PCP to discuss kidney cysts. Pt feels pain level and functioning improves with prescribed medications and is able to perform ADLs. Pt feels that prolonged standing aggravates the pain. Pt c/o RLE tingling in the posterior thigh. Lumbar MRI report L4-5 moderate to severe right foraminal stenosis worsened since 2015; slightly increased bilateral renal cysts since 2015; other mild degenerative changes throughout. EMG lower extremities done June 30, 2021 was normal.   He has not started physical therapy due to transportation. Previously saw Dr. Francis Padron for shoulders. He has a knee brace but feels this makes his pain worse.  He does not get opioids due to previous history of marijuana use which he continues to do - considering medical marijuana. 11/16/2021 right L4-5 AYUSH      Review of Systems   Constitutional: Negative for appetite change and fever. HENT: Negative for hearing loss. Eyes: Negative for visual disturbance. Respiratory: Negative for shortness of breath. Gastrointestinal: Negative for blood in stool. Genitourinary: Negative for difficulty urinating and hematuria. Musculoskeletal: Positive for arthralgias and back pain. Skin: Negative for rash. Neurological: Negative for facial asymmetry. Hematological: Negative for adenopathy. Psychiatric/Behavioral: Negative for self-injury. All other systems reviewed and are negative. Objective:     Vitals:  /76   Temp 97.6 °F (36.4 °C) (Infrared)   Ht 5' 9\" (1.753 m)   Wt 185 lb (83.9 kg)   BMI 27.32 kg/m² Pain Score:   8      Physical Exam  Vitals and nursing note reviewed. Pt is alert and oriented x 3. Recent and remote memory is intact. Mood, judgement and affect are normal.  No signs of distress or SOB noted. Visualized skin intact. Sensation intact to light touch. Decreased ROM with flexion and extension of low back. Tender with palpation to bilateral lumbar spine with positive provacative maneuvers noted. Negative SLR. Pt is able to briefly heel walk and toe walk. Strength, balance, and coordination are functional for ambulation. Assessment:      Diagnosis Orders   1. Lumbosacral spondylosis without myelopathy  gabapentin (NEURONTIN) 600 MG tablet   2. Spondylosis of lumbar region without myelopathy or radiculopathy  diclofenac sodium (VOLTAREN) 1 % GEL       Plan:     Periodic Controlled Substance Monitoring: No signs of potential drug abuse or diversion identified. Peter Velázquez, APRN - CNP)    Orders Placed This Encounter   Medications    gabapentin (NEURONTIN) 600 MG tablet     Sig: Take 1 tablet by mouth 4 times daily for 90 days.      Dispense:  360 tablet     Refill:  0    diclofenac sodium (VOLTAREN) 1 % GEL     Sig: Apply 2 g topically 4 times daily as needed for Pain     Dispense:  100 g     Refill:  2    tiZANidine (ZANAFLEX) 2 MG tablet     Sig: Take 1 tablet by mouth 2 times daily as needed (pain)     Dispense:  180 tablet     Refill:  0       No orders of the defined types were placed in this encounter. Discussed options with the patient today. Returning next week for RFA. Will send Gabapentin, voltaren gel and Tizanidine for 90 day RX. Aware he can get these from PCP as well. All questions were answered. Pt verbalized understanding and agrees with above plan. Will continue medications for chronic pain as they help pt function with ADL and improve quality of life. Discussed possible risks of opiate medication with pt, including but not limited to, constipation, nausea or vomiting, sedation, urinary retention, dependence and possible addiction. Pt agrees to use medication as directed. Pt advised to not use opiates while driving or operating heavy equipment, or in situations where pt may harm him/herself or others. Pt is aware that while on narcotics, pt needs to be seen monthly to reassess pain and need for continued medication. NDP reviewed. OARRS was reviewed. This NP saw pt under direct supervision of Dr. Clara Rhoades. Follow up:  Return in about 3 months (around 4/27/2022) for review meds and reassess pain.     LOUIE Fuentes - CNP

## 2022-02-23 ENCOUNTER — OFFICE VISIT (OUTPATIENT)
Dept: PAIN MANAGEMENT | Age: 42
End: 2022-02-23
Payer: COMMERCIAL

## 2022-02-23 DIAGNOSIS — M47.817 LUMBOSACRAL SPONDYLOSIS WITHOUT MYELOPATHY: Primary | ICD-10-CM

## 2022-02-23 PROCEDURE — 64635 DESTROY LUMB/SAC FACET JNT: CPT | Performed by: PHYSICAL MEDICINE & REHABILITATION

## 2022-02-23 PROCEDURE — 64636 DESTROY L/S FACET JNT ADDL: CPT | Performed by: PHYSICAL MEDICINE & REHABILITATION

## 2022-02-23 RX ORDER — BETAMETHASONE SODIUM PHOSPHATE AND BETAMETHASONE ACETATE 3; 3 MG/ML; MG/ML
6 INJECTION, SUSPENSION INTRA-ARTICULAR; INTRALESIONAL; INTRAMUSCULAR; SOFT TISSUE ONCE
Status: COMPLETED | OUTPATIENT
Start: 2022-02-23 | End: 2022-02-23

## 2022-02-23 RX ORDER — LIDOCAINE HYDROCHLORIDE 10 MG/ML
10 INJECTION, SOLUTION INFILTRATION; PERINEURAL ONCE
Status: COMPLETED | OUTPATIENT
Start: 2022-02-23 | End: 2022-02-23

## 2022-02-23 RX ADMIN — LIDOCAINE HYDROCHLORIDE 10 ML: 10 INJECTION, SOLUTION INFILTRATION; PERINEURAL at 15:55

## 2022-02-23 RX ADMIN — BETAMETHASONE SODIUM PHOSPHATE AND BETAMETHASONE ACETATE 6 MG: 3; 3 INJECTION, SUSPENSION INTRA-ARTICULAR; INTRALESIONAL; INTRAMUSCULAR; SOFT TISSUE at 15:54

## 2022-02-23 RX ADMIN — Medication 0.5 MEQ: at 15:55

## 2022-04-26 DIAGNOSIS — M47.817 LUMBOSACRAL SPONDYLOSIS WITHOUT MYELOPATHY: ICD-10-CM

## 2022-04-26 DIAGNOSIS — M47.816 SPONDYLOSIS OF LUMBAR REGION WITHOUT MYELOPATHY OR RADICULOPATHY: ICD-10-CM

## 2022-04-26 NOTE — TELEPHONE ENCOUNTER
Requested Prescriptions     Pending Prescriptions Disp Refills    gabapentin (NEURONTIN) 600 MG tablet 360 tablet 0     Sig: Take 1 tablet by mouth 4 times daily for 90 days.     diclofenac sodium (VOLTAREN) 1 %  g 2     Sig: Apply 2 g topically 4 times daily as needed for Pain    tiZANidine (ZANAFLEX) 2 MG tablet 180 tablet 0     Sig: Take 1 tablet by mouth 2 times daily as needed (pain)       Patient last seen on:  2/23/22  Date of last surgery:  n/a  Date of last refill:  1/27/22  Pain level:  n/a  Patient complaining of:  Pt requesting refill/had to cancel today's appt, no transportation  Future appts: 5/26/22

## 2022-04-27 RX ORDER — TIZANIDINE 2 MG/1
2 TABLET ORAL 2 TIMES DAILY PRN
Qty: 180 TABLET | Refills: 0 | Status: SHIPPED | OUTPATIENT
Start: 2022-04-27 | End: 2022-08-04 | Stop reason: SDUPTHER

## 2022-04-27 RX ORDER — GABAPENTIN 600 MG/1
600 TABLET ORAL 4 TIMES DAILY
Qty: 360 TABLET | Refills: 0 | Status: SHIPPED | OUTPATIENT
Start: 2022-04-27 | End: 2022-08-04 | Stop reason: SDUPTHER

## 2022-07-27 RX ORDER — TIZANIDINE 2 MG/1
2 TABLET ORAL 2 TIMES DAILY PRN
Qty: 180 TABLET | Refills: 0 | OUTPATIENT
Start: 2022-07-27 | End: 2022-10-25

## 2022-08-04 ENCOUNTER — OFFICE VISIT (OUTPATIENT)
Dept: PAIN MANAGEMENT | Age: 42
End: 2022-08-04
Payer: COMMERCIAL

## 2022-08-04 VITALS — WEIGHT: 185 LBS | BODY MASS INDEX: 27.4 KG/M2 | HEIGHT: 69 IN

## 2022-08-04 DIAGNOSIS — M47.816 SPONDYLOSIS OF LUMBAR REGION WITHOUT MYELOPATHY OR RADICULOPATHY: ICD-10-CM

## 2022-08-04 DIAGNOSIS — M47.817 LUMBOSACRAL SPONDYLOSIS WITHOUT MYELOPATHY: ICD-10-CM

## 2022-08-04 PROCEDURE — 99214 OFFICE O/P EST MOD 30 MIN: CPT | Performed by: NURSE PRACTITIONER

## 2022-08-04 PROCEDURE — G8427 DOCREV CUR MEDS BY ELIG CLIN: HCPCS | Performed by: NURSE PRACTITIONER

## 2022-08-04 PROCEDURE — 4004F PT TOBACCO SCREEN RCVD TLK: CPT | Performed by: NURSE PRACTITIONER

## 2022-08-04 PROCEDURE — G8419 CALC BMI OUT NRM PARAM NOF/U: HCPCS | Performed by: NURSE PRACTITIONER

## 2022-08-04 RX ORDER — GABAPENTIN 600 MG/1
600 TABLET ORAL 4 TIMES DAILY
Qty: 360 TABLET | Refills: 0 | Status: SHIPPED | OUTPATIENT
Start: 2022-08-04 | End: 2022-11-04 | Stop reason: SDUPTHER

## 2022-08-04 RX ORDER — TIZANIDINE 2 MG/1
2 TABLET ORAL 2 TIMES DAILY PRN
Qty: 180 TABLET | Refills: 0 | Status: SHIPPED | OUTPATIENT
Start: 2022-08-04 | End: 2022-11-04 | Stop reason: SDUPTHER

## 2022-08-04 ASSESSMENT — ENCOUNTER SYMPTOMS
BLOOD IN STOOL: 0
SHORTNESS OF BREATH: 0

## 2022-08-04 NOTE — PROGRESS NOTES
Gurinder Mccarty  ([birth year])    8/4/2022    Subjective:     Gurinder Mccarty is 35 y.o. male who complains today of:    Chief Complaint   Patient presents with    Back Pain    Medication Refill    Leg Pain     Both, back of thighs          Allergies:  Patient has no known allergies. Past Medical History:   Diagnosis Date    Chronic back pain      Past Surgical History:   Procedure Laterality Date    HERNIA REPAIR      NECK SURGERY      tumor removed from neck     Family History   Problem Relation Age of Onset    Heart Disease Mother      Social History     Socioeconomic History    Marital status: Single     Spouse name: Not on file    Number of children: Not on file    Years of education: Not on file    Highest education level: Not on file   Occupational History    Not on file   Tobacco Use    Smoking status: Every Day     Packs/day: 1.00     Years: 10.00     Pack years: 10.00     Types: Cigarettes    Smokeless tobacco: Never    Tobacco comments:     patient stated he is trying to quit   Substance and Sexual Activity    Alcohol use:  Yes     Alcohol/week: 0.0 standard drinks     Comment: RARELY    Drug use: Not on file    Sexual activity: Not on file   Other Topics Concern    Not on file   Social History Narrative    Not on file     Social Determinants of Health     Financial Resource Strain: Not on file   Food Insecurity: Not on file   Transportation Needs: Not on file   Physical Activity: Not on file   Stress: Not on file   Social Connections: Not on file   Intimate Partner Violence: Not on file   Housing Stability: Not on file       Current Outpatient Medications on File Prior to Visit   Medication Sig Dispense Refill    diclofenac sodium (VOLTAREN) 1 % GEL APPLY 4 GRAMS TOPICALLY TO KNEE JOINT 4 TIMES A DAY AS NEEDED FOR PAIN 500 g 1    meloxicam (MOBIC) 7.5 MG tablet Take 2 tablets by mouth daily 30 tablet 0    VENTOLIN  (90 Base) MCG/ACT inhaler INHALE 2 PUFFS EVERY 4 TO 6 HOURS AS NEEDED 2     No current facility-administered medications on file prior to visit. Pt presents today for a f/u of chronic low back pain. Has not been seen since February. He does feel the RFA helped. He continues to have deaths in his extended family. He plans to travel back to [facility], not sure when. Previously advised to see PCP to discuss kidney cysts. Pt feels pain level and functioning improves with prescribed medications and is able to perform ADLs. Pt feels that prolonged standing aggravates the pain. Pt c/o RLE tingling in the posterior thigh. Lumbar MRI report L4-5 moderate to severe right foraminal stenosis worsened since 2015; slightly increased bilateral renal cysts since 2015; other mild degenerative changes throughout. EMG lower extremities done June 30, 2021 was normal.   He has not started physical therapy due to transportation. Previously saw Dr. Leopold Puff for shoulders. He has a knee brace but feels this makes his pain worse. He does not get opioids due to previous history of marijuana use which he continues to do - considering medical marijuana. 2/23/22 BL L345 RFA  11/16/2021 right L4-5 AYUSH      Review of Systems   Constitutional:  Negative for appetite change and fever. HENT:  Negative for hearing loss. Eyes:  Negative for visual disturbance. Respiratory:  Negative for shortness of breath. Gastrointestinal:  Negative for blood in stool. Genitourinary:  Negative for difficulty urinating and hematuria. Musculoskeletal:  Positive for arthralgias. Skin:  Negative for rash. Neurological:  Negative for facial asymmetry. Hematological:  Negative for adenopathy. Psychiatric/Behavioral:  Negative for self-injury. All other systems reviewed and are negative. Objective:     Vitals:  Ht 5' 9\" (1.753 m)   Wt 185 lb (83.9 kg)   BMI 27.32 kg/m² Pain Score:   6      Physical Exam  Vitals and nursing note reviewed. Pt is alert and oriented x 3.   Recent and remote memory is intact. Mood, judgement and affect are normal.  No signs of distress or SOB noted. Visualized skin intact. Sensation intact to light touch. Decreased ROM with flexion and extension of low back. Tender with palpation to bilateral lumbar spine with positive provacative maneuvers noted. Negative SLR. Pt is able to briefly heel walk and toe walk. Strength, balance, and coordination are functional for ambulation. Assessment:      Diagnosis Orders   1. Lumbosacral spondylosis without myelopathy  gabapentin (NEURONTIN) 600 MG tablet    NH RADIOFREQUENCY NEUROTOMY LUMBAR OR SACRAL, W IMAGE GUIDANCE, SINGLE    NH RADIOFREQ NEUROTOMY LUMBAR OR SACRAL, W IMAGE GUIDE,EA ADDL LEVEL      2. Spondylosis of lumbar region without myelopathy or radiculopathy  diclofenac sodium (VOLTAREN) 1 % GEL          Plan:     Periodic Controlled Substance Monitoring: No signs of potential drug abuse or diversion identified. Nehemiah Velázquez, APRN - CNP)    Orders Placed This Encounter   Medications    gabapentin (NEURONTIN) 600 MG tablet     Sig: Take 1 tablet by mouth in the morning and 1 tablet at noon and 1 tablet in the evening and 1 tablet before bedtime. Do all this for 90 days. Dispense:  360 tablet     Refill:  0    diclofenac sodium (VOLTAREN) 1 % GEL     Sig: Apply 2 g topically 4 times daily as needed for Pain     Dispense:  100 g     Refill:  2    tiZANidine (ZANAFLEX) 2 MG tablet     Sig: Take 1 tablet by mouth 2 times daily as needed (pain)     Dispense:  180 tablet     Refill:  0       Orders Placed This Encounter   Procedures    NH RADIOFREQUENCY NEUROTOMY LUMBAR OR SACRAL, W IMAGE GUIDANCE, SINGLE     Standing Status:   Future     Standing Expiration Date:   11/2/2022    NH RADIOFREQ NEUROTOMY LUMBAR OR SACRAL, W IMAGE GUIDE,EA ADDL LEVEL     BL L345 RFA with SM, not until 8/23.      Standing Status:   Future     Standing Expiration Date:   11/2/2022     Discussed options with the patient today.  Will send Gabapentin, voltaren gel and Tizanidine for 90 day RX. Will repeat lumbar RFA which iously gave 50% pain relief for 6 months. All questions were answered. Pt verbalized understanding and agrees with above plan. We will go ahead and order  bilateral L3, L4, L5 RF ablation as pt has had >80% pain relief with diagnostic MBB's and improvement with past RF ablations. he has failed conservative treatment in the past. Anatomic model of pathology was shown. Risks and benefits of the procedure were discussed. All questions were answered and patient understands and agrees with the plan. Will continue medications for chronic pain as they help pt function with ADL and improve quality of life. D  OARRS was reviewed. This NP saw pt under direct supervision of Dr. Josette Erickson. Follow up:  Return in about 3 months (around 11/4/2022).     LOUIE Woods - CNP

## 2022-08-05 ENCOUNTER — TELEPHONE (OUTPATIENT)
Dept: PAIN MANAGEMENT | Age: 42
End: 2022-08-05

## 2022-08-05 NOTE — TELEPHONE ENCOUNTER
ORDER PLACED:    Date: 8/4/22  Description: BILAT L3,4,5 RFA NOT UNTIL 8/23/22  Order Number: [id number]  Ordering Provider: Mauri Hill  Performing Provider: Kami Becerra  CPT Codes: 00945,89859  ICD10 Codes: T82.115

## 2022-09-13 ENCOUNTER — OFFICE VISIT (OUTPATIENT)
Dept: PAIN MANAGEMENT | Age: 42
End: 2022-09-13
Payer: COMMERCIAL

## 2022-09-13 DIAGNOSIS — M47.817 LUMBOSACRAL SPONDYLOSIS WITHOUT MYELOPATHY: Primary | ICD-10-CM

## 2022-09-13 PROCEDURE — 64636 DESTROY L/S FACET JNT ADDL: CPT | Performed by: PHYSICAL MEDICINE & REHABILITATION

## 2022-09-13 PROCEDURE — 64635 DESTROY LUMB/SAC FACET JNT: CPT | Performed by: PHYSICAL MEDICINE & REHABILITATION

## 2022-09-13 RX ORDER — BETAMETHASONE SODIUM PHOSPHATE AND BETAMETHASONE ACETATE 3; 3 MG/ML; MG/ML
3 INJECTION, SUSPENSION INTRA-ARTICULAR; INTRALESIONAL; INTRAMUSCULAR; SOFT TISSUE ONCE
Status: COMPLETED | OUTPATIENT
Start: 2022-09-13 | End: 2022-09-13

## 2022-09-13 RX ORDER — LIDOCAINE HYDROCHLORIDE 10 MG/ML
12 INJECTION, SOLUTION INFILTRATION; PERINEURAL ONCE
Status: SHIPPED | OUTPATIENT
Start: 2022-09-13

## 2022-09-13 RX ADMIN — Medication 0.5 MEQ: at 15:53

## 2022-09-13 RX ADMIN — BETAMETHASONE SODIUM PHOSPHATE AND BETAMETHASONE ACETATE 3 MG: 3; 3 INJECTION, SUSPENSION INTRA-ARTICULAR; INTRALESIONAL; INTRAMUSCULAR; SOFT TISSUE at 15:53

## 2022-09-13 NOTE — PROGRESS NOTES
Lumbar Radiofrequency Ablation/Neurotomy          Patient Name: Kailyn Munoz   : 1980  Date: 2022     Provider: Ligia Chauhan MD        Kailyn Munoz is here today for interventional pain management. Preoperatively, the patient presents with symptoms and physical exam findings consistent with lumbar facet zygapophyseal joint mediated pain. He has pain in both sides of his low back. A focused physical exam reveals tenderness to palpation over the lower lumbar spinous processes from L2 down to sacrum with bilateral paraspinal muscle tenderness. Rotation and extension reproduces axial low back pain. Other facet provocative maneuvers are positive. Straight leg raise is negative bilaterally. He has obtained greater than 50% pain relief for over 6 months from prior radiofrequency ablation. His pain has returned in a similar distribution, character, and intensity necessitating repeat treatment. He has had persistent pain that limits his function and activities of daily living. The pain is persistent despite conservative measures. He has significant functional and psychological impairment due to this condition. He has undergone diagnostic medial branch blocks with a positive diagnostic response. Given his symptoms, physical exam findings, impairment in activities of daily living, lack of response to conservative measures, and positive diagnostic response to medial branch blocks, consideration for lumbar facet/medial branch radiofrequency ablation/neurotomy was given.  Discussed the risks of the procedure including, but not limited to, bleeding, infection, worsened pain, damage to surrounding structures, post-ablation neuritis, worsened paresthesias, side effects, toxicity, allergic reactions to medications used, immune and stress-response dysfunction, fat necrosis, avascular necrosis, skin pigmentation changes, blood sugar elevation, headache, vision changes, need for surgery, as well as catastrophic injury such as vision loss, hip and/or leg weakness, paralysis, stroke, spinal cord infarction or injury, spinal cord puncture, arachnoiditis, bowel or bladder incontinence, loss of use of the legs, ventilator dependence, and death. Discussed the risks, benefits, alternative procedures, and alternatives to the procedure including no procedure at all. Discussed that we cannot undo any permanent neurologic damage or change the course of any underlying disease. After thorough discussion, patient expressed understanding and willingness to proceed. Written consent was obtained and is in the chart. Verbal consent to proceed was obtained. Standard ASIPP guidelines were followed and sterile technique used. Area was cleaned with Betadine three times. Fluoroscopic guidance was used for this procedure. The L5 vertebral body was taken as the first lumbar-appearing vertebral body directly above the sacrum on a lateral view. Appropriate oblique and declined views were obtained to open up the sulcii for the medial branch blocks and/or dorsal rami as appropriate. Then three 10 cm 18 gauge 10 mm active tip radiofrequency cannulas were used and advanced to the appropriate anatomic locations. There was limited multifidus contraction noted with motor stimulation at 2 Hz between 0.5-1.5 volts. No limb or gluteal contraction was noted taking it up to 3.5 volts. Prior to lesioning at 80 degrees Celsius for 90 seconds, approximately 0.5 mg of Betamethasone and 0.5 mL of 1% preservative-free Lidocaine was injected. Impedance was between 200-400 ohms during the procedure. One lesion was created at each level, SIS approach and extended time were utilized. Patient tolerated the procedure well, no obvious complications occurred during the procedure. Patient was appropriately monitored and discharged home in stable condition with their usual motor strength. Post procedure instructions were given to the patient. [x] Bilateral [] L1    [] L2   [] Right [x] L3    [x] L4   [] Left [x] L5                     [place], [address] [facility], [address]  Phone [phone]/[place] [phone]

## 2022-10-31 RX ORDER — TIZANIDINE 2 MG/1
2 TABLET ORAL 2 TIMES DAILY PRN
Qty: 180 TABLET | Refills: 0 | OUTPATIENT
Start: 2022-10-31 | End: 2023-01-29

## 2022-11-03 DIAGNOSIS — M47.817 LUMBOSACRAL SPONDYLOSIS WITHOUT MYELOPATHY: ICD-10-CM

## 2022-11-03 NOTE — TELEPHONE ENCOUNTER
Requested Prescriptions     Pending Prescriptions Disp Refills    gabapentin (NEURONTIN) 600 MG tablet 136 tablet 0     Sig: Take 1 tablet by mouth 4 times daily for 34 days.     tiZANidine (ZANAFLEX) 2 MG tablet 68 tablet 0     Sig: Take 1 tablet by mouth 2 times daily as needed (pain)       Patient last seen on:  9/13/22  Date of last surgery:  n/a  Date of last refill:  8/4/22  Pain level:  n/a  Patient complaining of:  Pt rs'ed appt and asks for rx  Future appts: 12/6/22

## 2022-11-04 RX ORDER — TIZANIDINE 2 MG/1
2 TABLET ORAL 2 TIMES DAILY PRN
Qty: 180 TABLET | Refills: 0 | Status: SHIPPED | OUTPATIENT
Start: 2022-11-04 | End: 2023-02-02

## 2022-11-04 RX ORDER — GABAPENTIN 600 MG/1
600 TABLET ORAL 4 TIMES DAILY
Qty: 360 TABLET | Refills: 0 | Status: SHIPPED | OUTPATIENT
Start: 2022-11-04 | End: 2023-02-02

## 2023-01-30 ENCOUNTER — OFFICE VISIT (OUTPATIENT)
Dept: PAIN MANAGEMENT | Age: 43
End: 2023-01-30
Payer: COMMERCIAL

## 2023-01-30 VITALS
WEIGHT: 192 LBS | SYSTOLIC BLOOD PRESSURE: 128 MMHG | BODY MASS INDEX: 29.1 KG/M2 | HEIGHT: 68 IN | TEMPERATURE: 98 F | DIASTOLIC BLOOD PRESSURE: 74 MMHG

## 2023-01-30 DIAGNOSIS — M47.817 LUMBOSACRAL SPONDYLOSIS WITHOUT MYELOPATHY: Primary | ICD-10-CM

## 2023-01-30 DIAGNOSIS — M25.572 CHRONIC PAIN OF LEFT ANKLE: ICD-10-CM

## 2023-01-30 DIAGNOSIS — M25.472 LEFT ANKLE SWELLING: ICD-10-CM

## 2023-01-30 DIAGNOSIS — G89.29 CHRONIC PAIN OF LEFT ANKLE: ICD-10-CM

## 2023-01-30 PROCEDURE — G8484 FLU IMMUNIZE NO ADMIN: HCPCS | Performed by: NURSE PRACTITIONER

## 2023-01-30 PROCEDURE — 99214 OFFICE O/P EST MOD 30 MIN: CPT | Performed by: NURSE PRACTITIONER

## 2023-01-30 PROCEDURE — G8419 CALC BMI OUT NRM PARAM NOF/U: HCPCS | Performed by: NURSE PRACTITIONER

## 2023-01-30 PROCEDURE — G8427 DOCREV CUR MEDS BY ELIG CLIN: HCPCS | Performed by: NURSE PRACTITIONER

## 2023-01-30 PROCEDURE — 4004F PT TOBACCO SCREEN RCVD TLK: CPT | Performed by: NURSE PRACTITIONER

## 2023-01-30 RX ORDER — GABAPENTIN 600 MG/1
600 TABLET ORAL 4 TIMES DAILY
Qty: 360 TABLET | Refills: 0 | Status: SHIPPED | OUTPATIENT
Start: 2023-02-02 | End: 2023-05-03

## 2023-01-30 RX ORDER — METAXALONE 800 MG/1
800 TABLET ORAL NIGHTLY PRN
Qty: 30 TABLET | Refills: 0 | Status: SHIPPED | OUTPATIENT
Start: 2023-01-30 | End: 2023-03-01

## 2023-01-30 RX ORDER — TIZANIDINE 2 MG/1
2 TABLET ORAL 2 TIMES DAILY PRN
Qty: 180 TABLET | Refills: 0 | Status: CANCELLED | OUTPATIENT
Start: 2023-02-02 | End: 2023-05-03

## 2023-01-30 ASSESSMENT — ENCOUNTER SYMPTOMS
COUGH: 0
DIARRHEA: 0
NAUSEA: 0
CONSTIPATION: 0
BACK PAIN: 1
EYES NEGATIVE: 1
GASTROINTESTINAL NEGATIVE: 1
SHORTNESS OF BREATH: 0

## 2023-01-30 NOTE — PROGRESS NOTES
Manasa Irwin  (0[id number])    1/30/2023    Subjective:     Manasa Irwin is 38 y.o. male who complains today of:    Chief Complaint   Patient presents with    Follow-up    Back Pain     Lower back pain     Leg Pain     Left lower leg pain and swelling          Allergies:  Patient has no known allergies. Past Medical History:   Diagnosis Date    Chronic back pain      Past Surgical History:   Procedure Laterality Date    HERNIA REPAIR      NECK SURGERY      tumor removed from neck     Family History   Problem Relation Age of Onset    Heart Disease Mother      Social History     Socioeconomic History    Marital status: Single     Spouse name: Not on file    Number of children: Not on file    Years of education: Not on file    Highest education level: Not on file   Occupational History    Not on file   Tobacco Use    Smoking status: Every Day     Packs/day: 1.00     Years: 10.00     Pack years: 10.00     Types: Cigarettes    Smokeless tobacco: Never    Tobacco comments:     patient stated he is trying to quit   Substance and Sexual Activity    Alcohol use:  Yes     Alcohol/week: 0.0 standard drinks     Comment: RARELY    Drug use: Not on file    Sexual activity: Not on file   Other Topics Concern    Not on file   Social History Narrative    Not on file     Social Determinants of Health     Financial Resource Strain: Not on file   Food Insecurity: Not on file   Transportation Needs: Not on file   Physical Activity: Not on file   Stress: Not on file   Social Connections: Not on file   Intimate Partner Violence: Not on file   Housing Stability: Not on file       Current Outpatient Medications on File Prior to Visit   Medication Sig Dispense Refill    tiZANidine (ZANAFLEX) 2 MG tablet Take 1 tablet by mouth 2 times daily as needed (pain) 180 tablet 0    diclofenac sodium (VOLTAREN) 1 % GEL APPLY 4 GRAMS TOPICALLY TO KNEE JOINT 4 TIMES A DAY AS NEEDED FOR PAIN 500 g 1    VENTOLIN  (90 Base) MCG/ACT inhaler INHALE 2 PUFFS EVERY 4 TO 6 HOURS AS NEEDED  2    meloxicam (MOBIC) 7.5 MG tablet Take 2 tablets by mouth daily (Patient not taking: Reported on 1/30/2023) 30 tablet 0     Current Facility-Administered Medications on File Prior to Visit   Medication Dose Route Frequency Provider Last Rate Last Admin    lidocaine 1 % injection 12 mL  12 mL Other Once Oni Nelson MD               Pt presents today for a f/u of his pain. PCP is at [facility]. Bilateral L3-4-5 RF ablation done on 9/13/2022 with Dr. Italo Travis. He says this helped significantly >50%. He says he is having more \"sharp pain\" in his low back. He denies fall, but says he slipped outside, but didn't fall. He says he was walking from store to house with groceries. He says he gets some swelling in Lt ankle at times. Patient history of chronic low back pain. Last seen in the office for follow-up in August.  He has been traveling to [facility].  He has been previously advised to see PCP to discuss kidney cysts which he says he hasn't done yet. He is tried right L4-5 transforaminal epidural steroid injections on 11/16/2021 in the past.  Lumbar MRI report L4-5 moderate to severe right foraminal stenosis worsened since 2015; slightly increased bilateral renal cysts since 2015; other mild degenerative changes throughout. EMG lower extremities done June 30, 2021 was normal.   He has not started physical therapy due to transportation. Previously see Dr. Thania Stewart for shoulders. He has a knee brace but feels this makes his pain worse. He does not get opioids due to previous history of marijuana use which he continues to do - considering medical marijuana. He uses gabapentin 600mg 3 times daily, Voltaren gel 1% apply up to 4 times a day. He says he isn't getting relief with zanaflex. Pt feels pain level can be 10/10.   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 Pt feels his medication helps   him function and improve his quality of life,. Pt denies radiating numbness and tingling, but admits to numbness in Lt ankle. Denies recent falls, injuries or trauma. Pt denies new weakness. Pt reports PT has been done in the past he says. He isn't interested in doing this again. Review of Systems   Constitutional:  Negative for fever. HENT: Negative. Eyes: Negative. Respiratory:  Negative for cough and shortness of breath. Cardiovascular:  Negative for chest pain. Gastrointestinal: Negative. Negative for constipation, diarrhea and nausea. Endocrine: Negative. Genitourinary: Negative. Musculoskeletal:  Positive for arthralgias and back pain. Skin: Negative. Neurological:  Negative for dizziness and weakness. Psychiatric/Behavioral: Negative. Reviewed Dr. Florina Harris notes and reports from 6/7/21:  XR R knee 11/8/2018: No fracture. Joint spaces preserved. XR L-spine 11/8/2018: Degenerative disease and facet arthropathy. No fracture. CT C-spine 2/24/2020: Facet arthropathy. No high-grade neural foraminal or spinal canal stenosis. No fracture. XR R shoulder 2/24/2020: No fracture. No acute osseous abnormality  XR R knee 2/24/2020: No fracture, negative right knee  XR C Spine 6/27/17: degenerative disc disease and facet arthropathy, no fracture. MRI R Knee 3/7/17: horizontal tear posterior horn of medial meniscus. XR LS Spine 8/10/16: 5 lumbar vertebrae, no fracture, min degen changes. XR LS SPine 1/13/15: narrowing L4/5 level. Minimal degen changes  MRI LS Spine 3/13/15: disc narrowing and bulging at L4/5, degenerative changes also noted at L5/S1. EMG B LE 3/1/16: This study is normal. There is no current electrodiagnostic evidence for active lumbosacral motor radiculopathy, lumbosacral plexopathy, or generalized large fiber sensorimotor peripheral polyneuropathy.   -Methocarbamol Robaxin 1500mg BID, Gabapentin 600mg QID min relief. Min relief Meloxicam 15mg. side effects Tizanidine 2mg. Min relief Magnesium 400mg daily. EMLA oint min relief. Objective:     Vitals:  /74 (Site: Right Upper Arm)   Temp 98 °F (36.7 °C) (Temporal)   Ht 5' 8\" (1.727 m)   Wt 192 lb (87.1 kg)   BMI 29.19 kg/m² Pain Score:   9      Physical Exam  Vitals and nursing note reviewed. This is a pleasant male who answers questions appropriately and follows commands. Pt is alert and oriented x 3. Smells of marijuana. Recent and remote memory is intact. Mood and affect, judgement and insight are normal.  No signs of distress, no dyspnea or SOB noted. HEENT: PERRL. Neck is supple, trachea midline. No lymphadenopathy noted. Decreased ROM with flexion and extension of low back. Mild tenderness with palpation to lumbar spine with palpation. Negative SLR. Tightness in both hamstrings noted. Pt is able to briefly heel walk and toe walk. Mild swelling noted Lt LE/ankle with decreased ROM only at extremes. Mild tenderness with palpation over Lt ankle. Balance and coordination normal.  Strength is functional for ambulation. Cranial nerves II-XII are intact. Assessment:      Diagnosis Orders   1. Lumbosacral spondylosis without myelopathy  gabapentin (NEURONTIN) 600 MG tablet    diclofenac sodium (VOLTAREN) 1 % GEL    metaxalone (SKELAXIN) 800 MG tablet      2. Left ankle swelling  Anna Bright DPM, Podiatry, [place]      3. Chronic pain of left ankle  Anna Bright DPM, Podiatry, [place]          Plan:          Orders Placed This Encounter   Medications    gabapentin (NEURONTIN) 600 MG tablet     Sig: Take 1 tablet by mouth 4 times daily for 90 days.      Dispense:  360 tablet     Refill:  0    diclofenac sodium (VOLTAREN) 1 % GEL     Sig: Apply 2 g topically 4 times daily as needed for Pain     Dispense:  100 g     Refill:  2    metaxalone (SKELAXIN) 800 MG tablet     Sig: Take 1 tablet by mouth nightly as needed for Pain (spasm)     Dispense:  30 tablet     Refill:  0         Orders Placed This Encounter   Procedures    Rimma Li DPM, Podiatry, [place]     Referral Priority:   Routine     Referral Type:   Eval and Treat     Referral Reason:   Specialty Services Required     Referred to Provider:   Mary Ann Goode DPM     Requested Specialty:   Podiatry     Number of Visits Requested:   1       Discussed options with the patient today. Anatomic model pathology was shown and reviewed with pt. We will have patient consult with podiatry, Dr. Denisse Whitley for left ankle swelling and pain. Encourage patient to follow-up with PCP as well for swelling and to review kidney cyst as discussed in the past and discussed importance of this. He says he goes to [facility]. Continue his gabapentin 600 mg 3 times daily which he feels helps with his neuropathy symptoms. He did well status post RF ablation in September and may need to repeat this in March. Continue diclofenac sodium gel 1% apply 2 g up to 4 a day and will start a trial of Skelaxin 800 mg nightly as needed spasms. Discontinue Zanaflex due to lack of relief he reports. All questions were answered. Discussed home exercise program and  smoking cessation. Relevant imaging and pain generators reviewed. Pt verbalized understanding and agrees with above plan. Pt has chronic pain. Will continue medications for chronic pain that has been previously directed as they do help pt function with ADL and improve quality of life. OARRS was reviewed. This NP saw pt under direct supervision of Dr. Yola Hensley. Follow up:  Return in about 3 months (around 4/30/2023) for review meds and reassess pain.     Leonetta Gaucher DeLisio, LOUIE - CNP

## 2023-01-31 RX ORDER — TIZANIDINE 2 MG/1
2 TABLET ORAL 2 TIMES DAILY PRN
Qty: 180 TABLET | Refills: 0 | OUTPATIENT
Start: 2023-01-31 | End: 2023-05-01

## 2023-01-31 NOTE — TELEPHONE ENCOUNTER
Requested Prescriptions     Pending Prescriptions Disp Refills    tiZANidine (ZANAFLEX) 2 MG tablet [Pharmacy Med Name: TIZANIDINE HCL 2 MG TABLET] 180 tablet 0     Sig: TAKE 1 TABLET BY MOUTH 2 TIMES DAILY AS NEEDED (PAIN).        Patient last seen on: 01/30/23  Date of last refill: 11/04/22  Future appts: 04/24/23

## 2023-04-24 ENCOUNTER — OFFICE VISIT (OUTPATIENT)
Dept: PAIN MANAGEMENT | Age: 43
End: 2023-04-24
Payer: COMMERCIAL

## 2023-04-24 VITALS
SYSTOLIC BLOOD PRESSURE: 118 MMHG | WEIGHT: 189 LBS | TEMPERATURE: 97.7 F | DIASTOLIC BLOOD PRESSURE: 80 MMHG | BODY MASS INDEX: 29.66 KG/M2 | HEIGHT: 67 IN

## 2023-04-24 DIAGNOSIS — M47.817 LUMBOSACRAL SPONDYLOSIS WITHOUT MYELOPATHY: ICD-10-CM

## 2023-04-24 DIAGNOSIS — M48.061 LUMBAR FORAMINAL STENOSIS: Primary | ICD-10-CM

## 2023-04-24 PROCEDURE — G8419 CALC BMI OUT NRM PARAM NOF/U: HCPCS | Performed by: NURSE PRACTITIONER

## 2023-04-24 PROCEDURE — 4004F PT TOBACCO SCREEN RCVD TLK: CPT | Performed by: NURSE PRACTITIONER

## 2023-04-24 PROCEDURE — 99213 OFFICE O/P EST LOW 20 MIN: CPT | Performed by: NURSE PRACTITIONER

## 2023-04-24 PROCEDURE — G8427 DOCREV CUR MEDS BY ELIG CLIN: HCPCS | Performed by: NURSE PRACTITIONER

## 2023-04-24 RX ORDER — GABAPENTIN 600 MG/1
600 TABLET ORAL 4 TIMES DAILY
Qty: 360 TABLET | Refills: 0 | Status: SHIPPED | OUTPATIENT
Start: 2023-04-24 | End: 2023-07-23

## 2023-04-24 RX ORDER — METAXALONE 800 MG/1
800 TABLET ORAL NIGHTLY PRN
Qty: 20 TABLET | Refills: 2 | Status: SHIPPED | OUTPATIENT
Start: 2023-04-24 | End: 2023-07-23

## 2023-04-24 ASSESSMENT — ENCOUNTER SYMPTOMS
BACK PAIN: 1
SHORTNESS OF BREATH: 0
DIARRHEA: 0
GASTROINTESTINAL NEGATIVE: 1
CONSTIPATION: 0
EYES NEGATIVE: 1
COUGH: 0
NAUSEA: 0

## 2023-04-24 NOTE — PROGRESS NOTES
Blanca Wilder  ([id number])    4/24/2023    Subjective:     Blanca Wilder is 38 y.o. male who complains today of:    Chief Complaint   Patient presents with    Follow-up    Back Pain    Knee Pain         Allergies:  Patient has no known allergies. Past Medical History:   Diagnosis Date    Chronic back pain      Past Surgical History:   Procedure Laterality Date    HERNIA REPAIR      NECK SURGERY      tumor removed from neck     Family History   Problem Relation Age of Onset    Heart Disease Mother      Social History     Socioeconomic History    Marital status: Single     Spouse name: Not on file    Number of children: Not on file    Years of education: Not on file    Highest education level: Not on file   Occupational History    Not on file   Tobacco Use    Smoking status: Every Day     Packs/day: 1.00     Years: 10.00     Pack years: 10.00     Types: Cigarettes    Smokeless tobacco: Never    Tobacco comments:     patient stated he is trying to quit   Substance and Sexual Activity    Alcohol use:  Yes     Alcohol/week: 0.0 standard drinks     Comment: RARELY    Drug use: Not on file    Sexual activity: Not on file   Other Topics Concern    Not on file   Social History Narrative    Not on file     Social Determinants of Health     Financial Resource Strain: Not on file   Food Insecurity: Not on file   Transportation Needs: Not on file   Physical Activity: Not on file   Stress: Not on file   Social Connections: Not on file   Intimate Partner Violence: Not on file   Housing Stability: Not on file       Current Outpatient Medications on File Prior to Visit   Medication Sig Dispense Refill    diclofenac sodium (VOLTAREN) 1 % GEL APPLY 4 GRAMS TOPICALLY TO KNEE JOINT 4 TIMES A DAY AS NEEDED FOR PAIN 500 g 1    meloxicam (MOBIC) 7.5 MG tablet Take 2 tablets by mouth daily 30 tablet 0    VENTOLIN  (90 Base) MCG/ACT inhaler INHALE 2 PUFFS EVERY 4 TO 6 HOURS AS NEEDED  2    diclofenac sodium

## 2023-05-01 ENCOUNTER — TELEPHONE (OUTPATIENT)
Dept: PAIN MANAGEMENT | Age: 43
End: 2023-05-01

## 2023-06-20 ENCOUNTER — TELEPHONE (OUTPATIENT)
Dept: PAIN MANAGEMENT | Age: 43
End: 2023-06-20

## 2023-06-20 NOTE — TELEPHONE ENCOUNTER
Tried reaching patient, phone numbers out of service or not working that we have on file. Appt on 6/22 @ 11:30 needs to be rescheduled due to no RFA's at 11:30.

## 2023-06-28 ENCOUNTER — APPOINTMENT (RX ONLY)
Dept: URBAN - METROPOLITAN AREA CLINIC 10 | Facility: CLINIC | Age: 43
Setting detail: DERMATOLOGY
End: 2023-06-28

## 2023-06-28 DIAGNOSIS — L65.9 NONSCARRING HAIR LOSS, UNSPECIFIED: ICD-10-CM

## 2023-06-28 DIAGNOSIS — L82.1 OTHER SEBORRHEIC KERATOSIS: ICD-10-CM

## 2023-06-28 DIAGNOSIS — Z71.89 OTHER SPECIFIED COUNSELING: ICD-10-CM

## 2023-06-28 DIAGNOSIS — D22 MELANOCYTIC NEVI: ICD-10-CM

## 2023-06-28 DIAGNOSIS — L81.4 OTHER MELANIN HYPERPIGMENTATION: ICD-10-CM

## 2023-06-28 DIAGNOSIS — D18.0 HEMANGIOMA: ICD-10-CM

## 2023-06-28 DIAGNOSIS — L71.8 OTHER ROSACEA: ICD-10-CM

## 2023-06-28 PROBLEM — D22.5 MELANOCYTIC NEVI OF TRUNK: Status: ACTIVE | Noted: 2023-06-28

## 2023-06-28 PROBLEM — D18.01 HEMANGIOMA OF SKIN AND SUBCUTANEOUS TISSUE: Status: ACTIVE | Noted: 2023-06-28

## 2023-06-28 PROCEDURE — ? TREATMENT REGIMEN

## 2023-06-28 PROCEDURE — ? PRESCRIPTION

## 2023-06-28 PROCEDURE — 99203 OFFICE O/P NEW LOW 30 MIN: CPT

## 2023-06-28 PROCEDURE — ? COUNSELING

## 2023-06-28 RX ORDER — METRONIDAZOLE 7.5 MG/G
SMALL AMOUNT CREAM TOPICAL BID
Qty: 45 | Refills: 11 | Status: ERX | COMMUNITY
Start: 2023-06-28

## 2023-06-28 RX ORDER — MINOCYCLINE HYDROCHLORIDE 100 MG/1
1 CAPSULE ORAL BID
Qty: 60 | Refills: 5 | Status: ERX | COMMUNITY
Start: 2023-06-28

## 2023-06-28 RX ORDER — MINOXIDIL 2.5 MG/1
1 TABLET ORAL QD
Qty: 365 | Refills: 1 | Status: ERX | COMMUNITY
Start: 2023-06-28

## 2023-06-28 RX ORDER — FINASTERIDE 1 MG/1
1 TABLET, FILM COATED ORAL QD
Qty: 365 | Refills: 1 | Status: ERX | COMMUNITY
Start: 2023-06-28

## 2023-06-28 RX ADMIN — METRONIDAZOLE SMALL AMOUNT: 7.5 CREAM TOPICAL at 00:00

## 2023-06-28 RX ADMIN — MINOXIDIL 1: 2.5 TABLET ORAL at 00:00

## 2023-06-28 RX ADMIN — MINOCYCLINE HYDROCHLORIDE 1: 100 CAPSULE ORAL at 00:00

## 2023-06-28 RX ADMIN — FINASTERIDE 1: 1 TABLET, FILM COATED ORAL at 00:00

## 2023-06-28 ASSESSMENT — LOCATION SIMPLE DESCRIPTION DERM
LOCATION SIMPLE: RIGHT UPPER BACK
LOCATION SIMPLE: ANTERIOR SCALP
LOCATION SIMPLE: RIGHT CHEEK
LOCATION SIMPLE: LOWER BACK
LOCATION SIMPLE: UPPER BACK
LOCATION SIMPLE: LEFT CHEEK

## 2023-06-28 ASSESSMENT — LOCATION DETAILED DESCRIPTION DERM
LOCATION DETAILED: SUPERIOR LUMBAR SPINE
LOCATION DETAILED: SUPERIOR THORACIC SPINE
LOCATION DETAILED: LEFT CENTRAL MALAR CHEEK
LOCATION DETAILED: MID-FRONTAL SCALP
LOCATION DETAILED: INFERIOR THORACIC SPINE
LOCATION DETAILED: RIGHT CENTRAL MALAR CHEEK
LOCATION DETAILED: RIGHT SUPERIOR UPPER BACK

## 2023-06-28 ASSESSMENT — LOCATION ZONE DERM
LOCATION ZONE: TRUNK
LOCATION ZONE: SCALP
LOCATION ZONE: FACE

## 2023-06-29 ENCOUNTER — OFFICE VISIT (OUTPATIENT)
Dept: PAIN MANAGEMENT | Age: 43
End: 2023-06-29

## 2023-06-29 DIAGNOSIS — M47.817 LUMBOSACRAL SPONDYLOSIS WITHOUT MYELOPATHY: Primary | ICD-10-CM

## 2023-06-29 RX ORDER — BETAMETHASONE SODIUM PHOSPHATE AND BETAMETHASONE ACETATE 3; 3 MG/ML; MG/ML
3 INJECTION, SUSPENSION INTRA-ARTICULAR; INTRALESIONAL; INTRAMUSCULAR; SOFT TISSUE ONCE
Status: COMPLETED | OUTPATIENT
Start: 2023-06-29 | End: 2023-06-29

## 2023-06-29 RX ORDER — LIDOCAINE HYDROCHLORIDE 10 MG/ML
10 INJECTION, SOLUTION INFILTRATION; PERINEURAL ONCE
Status: COMPLETED | OUTPATIENT
Start: 2023-06-29 | End: 2023-06-29

## 2023-06-29 RX ADMIN — Medication 0.5 MEQ: at 16:55

## 2023-06-29 RX ADMIN — LIDOCAINE HYDROCHLORIDE 10 ML: 10 INJECTION, SOLUTION INFILTRATION; PERINEURAL at 16:54

## 2023-06-29 RX ADMIN — BETAMETHASONE SODIUM PHOSPHATE AND BETAMETHASONE ACETATE 3 MG: 3; 3 INJECTION, SUSPENSION INTRA-ARTICULAR; INTRALESIONAL; INTRAMUSCULAR; SOFT TISSUE at 16:54

## 2023-07-27 ENCOUNTER — OFFICE VISIT (OUTPATIENT)
Dept: PAIN MANAGEMENT | Age: 43
End: 2023-07-27
Payer: COMMERCIAL

## 2023-07-27 VITALS
HEART RATE: 95 BPM | WEIGHT: 185 LBS | TEMPERATURE: 97.9 F | DIASTOLIC BLOOD PRESSURE: 80 MMHG | BODY MASS INDEX: 27.4 KG/M2 | HEIGHT: 69 IN | OXYGEN SATURATION: 98 % | RESPIRATION RATE: 16 BRPM | SYSTOLIC BLOOD PRESSURE: 118 MMHG

## 2023-07-27 DIAGNOSIS — Z76.89 ENCOUNTER TO ESTABLISH CARE: Primary | ICD-10-CM

## 2023-07-27 DIAGNOSIS — M47.817 LUMBOSACRAL SPONDYLOSIS WITHOUT MYELOPATHY: ICD-10-CM

## 2023-07-27 PROCEDURE — G8427 DOCREV CUR MEDS BY ELIG CLIN: HCPCS | Performed by: NURSE PRACTITIONER

## 2023-07-27 PROCEDURE — 4004F PT TOBACCO SCREEN RCVD TLK: CPT | Performed by: NURSE PRACTITIONER

## 2023-07-27 PROCEDURE — 99214 OFFICE O/P EST MOD 30 MIN: CPT | Performed by: NURSE PRACTITIONER

## 2023-07-27 PROCEDURE — G8419 CALC BMI OUT NRM PARAM NOF/U: HCPCS | Performed by: NURSE PRACTITIONER

## 2023-07-27 RX ORDER — GABAPENTIN 600 MG/1
600 TABLET ORAL 4 TIMES DAILY
Qty: 360 TABLET | Refills: 0 | Status: SHIPPED | OUTPATIENT
Start: 2023-07-27 | End: 2023-10-25

## 2023-07-27 RX ORDER — TIZANIDINE 2 MG/1
2 TABLET ORAL 2 TIMES DAILY PRN
Qty: 60 TABLET | Refills: 0 | Status: SHIPPED | OUTPATIENT
Start: 2023-07-27 | End: 2023-08-26

## 2023-07-27 ASSESSMENT — ENCOUNTER SYMPTOMS
DIARRHEA: 0
BACK PAIN: 1
RESPIRATORY NEGATIVE: 1
CONSTIPATION: 0

## 2023-07-27 NOTE — PROGRESS NOTES
Patient: Raj Velazquez  YOB: 1980  Date: 7/27/23        Subjective:     Raj Velazquez is a 38 y.o. male who complains today of:    Chief Complaint   Patient presents with    Follow-up     Pt was unable to get Zanaflex through insurance, pt would like to discuss other options of muscle relaxer's or anything that will help his back. Allergies:  Patient has no known allergies. Past Medical History:   Diagnosis Date    Chronic back pain      Past Surgical History:   Procedure Laterality Date    HERNIA REPAIR      NECK SURGERY      tumor removed from neck     Family History   Problem Relation Age of Onset    Heart Disease Mother      Social History     Socioeconomic History    Marital status: Single     Spouse name: Not on file    Number of children: Not on file    Years of education: Not on file    Highest education level: Not on file   Occupational History    Not on file   Tobacco Use    Smoking status: Every Day     Packs/day: 1.00     Years: 10.00     Pack years: 10.00     Types: Cigarettes    Smokeless tobacco: Never    Tobacco comments:     patient stated he is trying to quit   Substance and Sexual Activity    Alcohol use: Yes     Alcohol/week: 0.0 standard drinks     Comment: RARELY    Drug use: Not on file    Sexual activity: Not on file   Other Topics Concern    Not on file   Social History Narrative    Not on file     Social Determinants of Health     Financial Resource Strain: Not on file   Food Insecurity: Not on file   Transportation Needs: Not on file   Physical Activity: Not on file   Stress: Not on file   Social Connections: Not on file   Intimate Partner Violence: Not on file   Housing Stability: Not on file       Current Outpatient Medications on File Prior to Visit   Medication Sig Dispense Refill    Tens Unit MISC by Does not apply route Apply to low back as directed.  1 each 0    diclofenac sodium (VOLTAREN) 1 % GEL APPLY 4 GRAMS TOPICALLY TO KNEE JOINT 4

## 2023-08-21 RX ORDER — TIZANIDINE 2 MG/1
2 TABLET ORAL 2 TIMES DAILY PRN
Qty: 60 TABLET | Refills: 0 | OUTPATIENT
Start: 2023-08-21 | End: 2023-09-20

## 2023-10-24 ENCOUNTER — OFFICE VISIT (OUTPATIENT)
Dept: PAIN MANAGEMENT | Age: 43
End: 2023-10-24
Payer: COMMERCIAL

## 2023-10-24 VITALS
SYSTOLIC BLOOD PRESSURE: 118 MMHG | TEMPERATURE: 98.2 F | DIASTOLIC BLOOD PRESSURE: 62 MMHG | BODY MASS INDEX: 29.03 KG/M2 | HEIGHT: 67 IN | WEIGHT: 185 LBS

## 2023-10-24 DIAGNOSIS — M47.817 LUMBOSACRAL SPONDYLOSIS WITHOUT MYELOPATHY: ICD-10-CM

## 2023-10-24 PROCEDURE — G8419 CALC BMI OUT NRM PARAM NOF/U: HCPCS | Performed by: NURSE PRACTITIONER

## 2023-10-24 PROCEDURE — G8484 FLU IMMUNIZE NO ADMIN: HCPCS | Performed by: NURSE PRACTITIONER

## 2023-10-24 PROCEDURE — 99214 OFFICE O/P EST MOD 30 MIN: CPT | Performed by: NURSE PRACTITIONER

## 2023-10-24 PROCEDURE — 4004F PT TOBACCO SCREEN RCVD TLK: CPT | Performed by: NURSE PRACTITIONER

## 2023-10-24 PROCEDURE — G8427 DOCREV CUR MEDS BY ELIG CLIN: HCPCS | Performed by: NURSE PRACTITIONER

## 2023-10-24 RX ORDER — GABAPENTIN 600 MG/1
600 TABLET ORAL 4 TIMES DAILY
Qty: 360 TABLET | Refills: 0 | Status: SHIPPED | OUTPATIENT
Start: 2023-10-24 | End: 2024-01-22

## 2023-10-24 RX ORDER — TIZANIDINE 2 MG/1
2 TABLET ORAL 2 TIMES DAILY PRN
Qty: 60 TABLET | Refills: 2 | Status: SHIPPED | OUTPATIENT
Start: 2023-10-24 | End: 2024-01-22

## 2023-10-24 ASSESSMENT — ENCOUNTER SYMPTOMS
COUGH: 0
CONSTIPATION: 0
NAUSEA: 0
GASTROINTESTINAL NEGATIVE: 1
BACK PAIN: 1
DIARRHEA: 0
SHORTNESS OF BREATH: 0
EYES NEGATIVE: 1

## 2023-10-24 NOTE — PROGRESS NOTES
Marquis Hilliard  ([birth year])    10/24/2023    Subjective:     Marquis Hilliard is 37 y.o. male who complains today of:    Chief Complaint   Patient presents with    Follow-up    Back Pain     Lower  Mainly right         Allergies:  Patient has no known allergies. Past Medical History:   Diagnosis Date    Chronic back pain      Past Surgical History:   Procedure Laterality Date    HERNIA REPAIR      NECK SURGERY      tumor removed from neck     Family History   Problem Relation Age of Onset    Heart Disease Mother      Social History     Socioeconomic History    Marital status: Single     Spouse name: Not on file    Number of children: Not on file    Years of education: Not on file    Highest education level: Not on file   Occupational History    Not on file   Tobacco Use    Smoking status: Every Day     Packs/day: 1.00     Years: 10.00     Additional pack years: 0.00     Total pack years: 10.00     Types: Cigarettes    Smokeless tobacco: Never    Tobacco comments:     patient stated he is trying to quit   Substance and Sexual Activity    Alcohol use: Yes     Alcohol/week: 0.0 standard drinks of alcohol     Comment: RARELY    Drug use: Not on file    Sexual activity: Not on file   Other Topics Concern    Not on file   Social History Narrative    Not on file     Social Determinants of Health     Financial Resource Strain: Not on file   Food Insecurity: Not on file   Transportation Needs: Not on file   Physical Activity: Not on file   Stress: Not on file   Social Connections: Not on file   Intimate Partner Violence: Not on file   Housing Stability: Not on file       Current Outpatient Medications on File Prior to Visit   Medication Sig Dispense Refill    Tens Unit MISC by Does not apply route Apply to low back as directed.  1 each 0    diclofenac sodium (VOLTAREN) 1 % GEL APPLY 4 GRAMS TOPICALLY TO KNEE JOINT 4 TIMES A DAY AS NEEDED FOR PAIN 500 g 1    meloxicam (MOBIC) 7.5 MG tablet Take 2 tablets by

## 2024-01-23 ENCOUNTER — OFFICE VISIT (OUTPATIENT)
Dept: PAIN MANAGEMENT | Age: 44
End: 2024-01-23
Payer: MEDICARE

## 2024-01-23 VITALS
WEIGHT: 185 LBS | DIASTOLIC BLOOD PRESSURE: 72 MMHG | TEMPERATURE: 97.9 F | SYSTOLIC BLOOD PRESSURE: 114 MMHG | HEIGHT: 69 IN | BODY MASS INDEX: 27.4 KG/M2

## 2024-01-23 DIAGNOSIS — M47.817 LUMBOSACRAL SPONDYLOSIS WITHOUT MYELOPATHY: ICD-10-CM

## 2024-01-23 PROCEDURE — G8427 DOCREV CUR MEDS BY ELIG CLIN: HCPCS | Performed by: NURSE PRACTITIONER

## 2024-01-23 PROCEDURE — 4004F PT TOBACCO SCREEN RCVD TLK: CPT | Performed by: NURSE PRACTITIONER

## 2024-01-23 PROCEDURE — G8419 CALC BMI OUT NRM PARAM NOF/U: HCPCS | Performed by: NURSE PRACTITIONER

## 2024-01-23 PROCEDURE — G8484 FLU IMMUNIZE NO ADMIN: HCPCS | Performed by: NURSE PRACTITIONER

## 2024-01-23 PROCEDURE — 99214 OFFICE O/P EST MOD 30 MIN: CPT | Performed by: NURSE PRACTITIONER

## 2024-01-23 RX ORDER — GABAPENTIN 600 MG/1
600 TABLET ORAL 4 TIMES DAILY
Qty: 360 TABLET | Refills: 0 | Status: SHIPPED | OUTPATIENT
Start: 2024-01-23 | End: 2024-04-22

## 2024-01-23 RX ORDER — TIZANIDINE 4 MG/1
4 TABLET ORAL NIGHTLY PRN
Qty: 90 TABLET | Refills: 0 | Status: SHIPPED | OUTPATIENT
Start: 2024-01-23 | End: 2024-04-22

## 2024-01-23 ASSESSMENT — ENCOUNTER SYMPTOMS
COUGH: 0
SHORTNESS OF BREATH: 0
BACK PAIN: 1
NAUSEA: 0
DIARRHEA: 0
CONSTIPATION: 0
GASTROINTESTINAL NEGATIVE: 1
EYES NEGATIVE: 1

## 2024-01-23 NOTE — PROGRESS NOTES
Charly Herron  (1980)    1/23/2024    Subjective:     Charly Herron is 43 y.o. male who complains today of:    Chief Complaint   Patient presents with    Follow-up    Back Pain         Allergies:  Patient has no known allergies.    Past Medical History:   Diagnosis Date    Chronic back pain      Past Surgical History:   Procedure Laterality Date    HERNIA REPAIR      NECK SURGERY      tumor removed from neck     Family History   Problem Relation Age of Onset    Heart Disease Mother      Social History     Socioeconomic History    Marital status: Single     Spouse name: Not on file    Number of children: Not on file    Years of education: Not on file    Highest education level: Not on file   Occupational History    Not on file   Tobacco Use    Smoking status: Every Day     Current packs/day: 1.00     Average packs/day: 1 pack/day for 10.0 years (10.0 ttl pk-yrs)     Types: Cigarettes    Smokeless tobacco: Never    Tobacco comments:     patient stated he is trying to quit   Substance and Sexual Activity    Alcohol use: Yes     Alcohol/week: 0.0 standard drinks of alcohol     Comment: RARELY    Drug use: Not on file    Sexual activity: Not on file   Other Topics Concern    Not on file   Social History Narrative    Not on file     Social Determinants of Health     Financial Resource Strain: Not on file   Food Insecurity: Not on file   Transportation Needs: Not on file   Physical Activity: Not on file   Stress: Not on file   Social Connections: Not on file   Intimate Partner Violence: Not on file   Housing Stability: Not on file       Current Outpatient Medications on File Prior to Visit   Medication Sig Dispense Refill    Tens Unit MISC by Does not apply route Apply to low back as directed. 1 each 0    diclofenac sodium (VOLTAREN) 1 % GEL APPLY 4 GRAMS TOPICALLY TO KNEE JOINT 4 TIMES A DAY AS NEEDED FOR PAIN 500 g 1    meloxicam (MOBIC) 7.5 MG tablet Take 2 tablets by mouth daily 30 tablet 0

## 2024-01-31 ENCOUNTER — TELEPHONE (OUTPATIENT)
Dept: PAIN MANAGEMENT | Age: 44
End: 2024-01-31

## 2024-01-31 NOTE — TELEPHONE ENCOUNTER
REFERRAL # [id number]    BILAT [id number] RFA    AUTH FROM 2/7/24-4/5/24    OK to schedule procedure approved as above.   Please note sides/levels approved and date range.   (If applicable, sides/levels approved may differ from those ordered)    TO BE SCHEDULED WITH DR ZAMORA

## 2024-02-01 NOTE — TELEPHONE ENCOUNTER
Unable to leave message, voicemail box not set up     (SARAH HARVEY [id number] RFA AUTH -- 2/7/24-4/5/24)  (Ne

## 2024-02-26 ENCOUNTER — OFFICE VISIT (OUTPATIENT)
Dept: PAIN MANAGEMENT | Age: 44
End: 2024-02-26
Payer: MEDICARE

## 2024-02-26 DIAGNOSIS — M47.817 LUMBOSACRAL SPONDYLOSIS WITHOUT MYELOPATHY: Primary | ICD-10-CM

## 2024-02-26 PROCEDURE — 64636 DESTROY L/S FACET JNT ADDL: CPT | Performed by: PAIN MEDICINE

## 2024-02-26 PROCEDURE — 64635 DESTROY LUMB/SAC FACET JNT: CPT | Performed by: PAIN MEDICINE

## 2024-02-26 RX ORDER — BETAMETHASONE SODIUM PHOSPHATE AND BETAMETHASONE ACETATE 3; 3 MG/ML; MG/ML
6 INJECTION, SUSPENSION INTRA-ARTICULAR; INTRALESIONAL; INTRAMUSCULAR; SOFT TISSUE ONCE
Status: COMPLETED | OUTPATIENT
Start: 2024-02-26 | End: 2024-02-26

## 2024-02-26 RX ORDER — LIDOCAINE HYDROCHLORIDE 10 MG/ML
3 INJECTION, SOLUTION EPIDURAL; INFILTRATION; INTRACAUDAL; PERINEURAL ONCE
Status: COMPLETED | OUTPATIENT
Start: 2024-02-26 | End: 2024-02-26

## 2024-02-26 RX ADMIN — BETAMETHASONE SODIUM PHOSPHATE AND BETAMETHASONE ACETATE 6 MG: 3; 3 INJECTION, SUSPENSION INTRA-ARTICULAR; INTRALESIONAL; INTRAMUSCULAR; SOFT TISSUE at 15:53

## 2024-02-26 RX ADMIN — LIDOCAINE HYDROCHLORIDE 3 ML: 10 INJECTION, SOLUTION EPIDURAL; INFILTRATION; INTRACAUDAL; PERINEURAL at 15:53

## 2024-02-26 NOTE — PROGRESS NOTES
[facility]  [facility]  [address], [address]  [place], [place]  P: [phone]  F: [phone]      Lumbar Radio Frequency Ablation     Provider: WALLACE BURTON DO          Patient Name: Charly Herron : 1980        Date: 2024      Charly Herron is here today for interventional pain management.  Standard ASI guidelines were followed and sterile technique used.  Area was cleaned with Betadine x3.  Informed consent was obtained.  Fluoroscopic guidance was used for this procedure. Multiple views of fluoroscopy were used during procedure to assist with needle placement. Appropriate sized RF 10mm active tip needle was used and advance to appropriate anatomic location.    There was appropriate multifidus contraction noted with motor stimulation at 2 Hz between 0.5-1.5 volts. No limb or gluteal contraction was noted taking it up to 3.5 volts. Prior to lesioning at 80 degrees Celsius for 90 seconds, approximately 0.75mg/1mg of Celestone and ½ cc of 1% preservative free Lidocaine was injected. Impedance was between 200-500 ohms during the procedure.     Patient tolerated the procedure well, no obvious complications occurred during the procedure.  Patient was appropriately monitored and discharged home in stable condition with their usual motor strength. Post Op instructions were given to patient.          [x] Bilateral [] T11 [] L1 [] S1     [] T12 [] L2 [] S2    [] Right  [x] L3 [] S3      [x] L4 [] S4    [] Left  [x] L5                              WALLACE BURTON DO

## 2024-04-22 ENCOUNTER — OFFICE VISIT (OUTPATIENT)
Dept: PAIN MANAGEMENT | Age: 44
End: 2024-04-22
Payer: MEDICARE

## 2024-04-22 VITALS
DIASTOLIC BLOOD PRESSURE: 70 MMHG | WEIGHT: 185 LBS | TEMPERATURE: 97.2 F | SYSTOLIC BLOOD PRESSURE: 124 MMHG | BODY MASS INDEX: 27.4 KG/M2 | HEIGHT: 69 IN

## 2024-04-22 DIAGNOSIS — M25.561 CHRONIC PAIN OF RIGHT KNEE: Primary | ICD-10-CM

## 2024-04-22 DIAGNOSIS — G89.29 CHRONIC PAIN OF RIGHT KNEE: Primary | ICD-10-CM

## 2024-04-22 DIAGNOSIS — M47.817 LUMBOSACRAL SPONDYLOSIS WITHOUT MYELOPATHY: ICD-10-CM

## 2024-04-22 DIAGNOSIS — M62.838 MUSCLE SPASM: ICD-10-CM

## 2024-04-22 PROCEDURE — G8419 CALC BMI OUT NRM PARAM NOF/U: HCPCS | Performed by: NURSE PRACTITIONER

## 2024-04-22 PROCEDURE — 4004F PT TOBACCO SCREEN RCVD TLK: CPT | Performed by: NURSE PRACTITIONER

## 2024-04-22 PROCEDURE — 99213 OFFICE O/P EST LOW 20 MIN: CPT | Performed by: NURSE PRACTITIONER

## 2024-04-22 PROCEDURE — G8427 DOCREV CUR MEDS BY ELIG CLIN: HCPCS | Performed by: NURSE PRACTITIONER

## 2024-04-22 RX ORDER — GABAPENTIN 600 MG/1
600 TABLET ORAL 4 TIMES DAILY
Qty: 360 TABLET | Refills: 0 | Status: SHIPPED | OUTPATIENT
Start: 2024-04-22 | End: 2024-07-21

## 2024-04-22 RX ORDER — TIZANIDINE 4 MG/1
4 TABLET ORAL NIGHTLY PRN
Qty: 90 TABLET | Refills: 0 | Status: SHIPPED | OUTPATIENT
Start: 2024-04-22 | End: 2024-07-21

## 2024-04-22 ASSESSMENT — ENCOUNTER SYMPTOMS
NAUSEA: 0
BACK PAIN: 1
COUGH: 0
SHORTNESS OF BREATH: 0
EYES NEGATIVE: 1
CONSTIPATION: 0
GASTROINTESTINAL NEGATIVE: 1
DIARRHEA: 0

## 2024-04-22 NOTE — PROGRESS NOTES
Charly Herron  (1980)    4/22/2024    Subjective:     Charly Herron is 43 y.o. male who complains today of:    Chief Complaint   Patient presents with    Follow-up    Back Pain     lower    Leg Pain         Allergies:  Patient has no known allergies.    Past Medical History:   Diagnosis Date    Chronic back pain      Past Surgical History:   Procedure Laterality Date    HERNIA REPAIR      NECK SURGERY      tumor removed from neck     Family History   Problem Relation Age of Onset    Heart Disease Mother      Social History     Socioeconomic History    Marital status: Single     Spouse name: Not on file    Number of children: Not on file    Years of education: Not on file    Highest education level: Not on file   Occupational History    Not on file   Tobacco Use    Smoking status: Every Day     Current packs/day: 1.00     Average packs/day: 1 pack/day for 10.0 years (10.0 ttl pk-yrs)     Types: Cigarettes    Smokeless tobacco: Never    Tobacco comments:     patient stated he is trying to quit   Substance and Sexual Activity    Alcohol use: Yes     Alcohol/week: 0.0 standard drinks of alcohol     Comment: RARELY    Drug use: Not on file    Sexual activity: Not on file   Other Topics Concern    Not on file   Social History Narrative    Not on file     Social Determinants of Health     Financial Resource Strain: Not on file   Food Insecurity: Not on file   Transportation Needs: Not on file   Physical Activity: Not on file   Stress: Not on file   Social Connections: Not on file   Intimate Partner Violence: Not on file   Housing Stability: Not on file       Current Outpatient Medications on File Prior to Visit   Medication Sig Dispense Refill    Tens Unit MISC by Does not apply route Apply to low back as directed. 1 each 0    meloxicam (MOBIC) 7.5 MG tablet Take 2 tablets by mouth daily 30 tablet 0    VENTOLIN  (90 Base) MCG/ACT inhaler INHALE 2 PUFFS EVERY 4 TO 6 HOURS AS NEEDED  2     Current

## 2024-04-23 DIAGNOSIS — G89.29 CHRONIC PAIN OF RIGHT KNEE: ICD-10-CM

## 2024-04-23 DIAGNOSIS — M25.561 CHRONIC PAIN OF RIGHT KNEE: ICD-10-CM

## 2024-04-23 PROCEDURE — 73562 X-RAY EXAM OF KNEE 3: CPT | Performed by: NURSE PRACTITIONER

## 2024-04-24 ENCOUNTER — TELEPHONE (OUTPATIENT)
Dept: PAIN MANAGEMENT | Age: 44
End: 2024-04-24

## 2024-04-24 DIAGNOSIS — M25.561 CHRONIC PAIN OF RIGHT KNEE: Primary | ICD-10-CM

## 2024-04-24 DIAGNOSIS — G89.29 CHRONIC PAIN OF RIGHT KNEE: Primary | ICD-10-CM

## 2024-04-24 DIAGNOSIS — M17.10 ARTHRITIS OF KNEE: ICD-10-CM

## 2024-04-25 ENCOUNTER — TELEPHONE (OUTPATIENT)
Dept: PAIN MANAGEMENT | Age: 44
End: 2024-04-25

## 2024-04-25 NOTE — TELEPHONE ENCOUNTER
RIGHT KNEE INJ     NO AUTH REQUIRED    OK to schedule procedure approved as above.   Please note sides/levels approved and date range.   (If applicable, sides/levels approved may differ from those ordered)    TO BE SCHEDULED WITH DR ZAMORA

## 2024-04-25 NOTE — TELEPHONE ENCOUNTER
Unable to leave message for the patient, voicemail box not set up    SM- RIGHT KNEE INJ -- NO AUTH REQUIRED)

## 2024-05-08 ENCOUNTER — TELEPHONE (OUTPATIENT)
Dept: PAIN MANAGEMENT | Age: 44
End: 2024-05-08

## 2024-05-08 DIAGNOSIS — M79.605 BILATERAL LEG PAIN: Primary | ICD-10-CM

## 2024-05-08 DIAGNOSIS — M79.604 BILATERAL LEG PAIN: Primary | ICD-10-CM

## 2024-05-08 NOTE — TELEPHONE ENCOUNTER
Pt called and stated that last night he was watching a movie then both of his thighs started to get a charley horse and spasm. He ended up falling from the chair he was in and had no feeling in his right leg for about 30/45 mins. His children helped him up but ever since he has been walking with a cane for assistance.

## 2024-05-09 NOTE — TELEPHONE ENCOUNTER
I'm sorry to hear about the worsened leg pain and cramping/spasms. If he has weakness or severe numbness or any other concerning neurologic symptoms, he should call 911 or go to the ER for further evaluation. I'm concerned that he now requires a cane for walking.     MRI LS Spine 9/10/21 reviewed, up to severe right L4/5 foraminal narrowing.     -Recommend scheduling earlier follow up with pain management to see if further treatment is required such as an epidural injection (R L4/5 TFESI)  -EMG B LE eval bilateral leg pain

## 2024-05-16 ENCOUNTER — PROCEDURE VISIT (OUTPATIENT)
Dept: PAIN MANAGEMENT | Age: 44
End: 2024-05-16
Payer: MEDICARE

## 2024-05-16 DIAGNOSIS — M25.561 CHRONIC PAIN OF RIGHT KNEE: Primary | ICD-10-CM

## 2024-05-16 DIAGNOSIS — G89.29 CHRONIC PAIN OF RIGHT KNEE: Primary | ICD-10-CM

## 2024-05-16 PROCEDURE — 77002 NEEDLE LOCALIZATION BY XRAY: CPT | Performed by: PHYSICAL MEDICINE & REHABILITATION

## 2024-05-16 PROCEDURE — 20610 DRAIN/INJ JOINT/BURSA W/O US: CPT | Performed by: PHYSICAL MEDICINE & REHABILITATION

## 2024-05-16 RX ORDER — LIDOCAINE HYDROCHLORIDE 10 MG/ML
5 INJECTION, SOLUTION EPIDURAL; INFILTRATION; INTRACAUDAL; PERINEURAL ONCE
Status: COMPLETED | OUTPATIENT
Start: 2024-05-16 | End: 2024-05-16

## 2024-05-16 RX ORDER — BETAMETHASONE SODIUM PHOSPHATE AND BETAMETHASONE ACETATE 3; 3 MG/ML; MG/ML
3 INJECTION, SUSPENSION INTRA-ARTICULAR; INTRALESIONAL; INTRAMUSCULAR; SOFT TISSUE ONCE
Status: COMPLETED | OUTPATIENT
Start: 2024-05-16 | End: 2024-05-16

## 2024-05-16 RX ADMIN — Medication 1 MEQ: at 13:36

## 2024-05-16 RX ADMIN — BETAMETHASONE SODIUM PHOSPHATE AND BETAMETHASONE ACETATE 3 MG: 3; 3 INJECTION, SUSPENSION INTRA-ARTICULAR; INTRALESIONAL; INTRAMUSCULAR; SOFT TISSUE at 13:36

## 2024-05-16 RX ADMIN — LIDOCAINE HYDROCHLORIDE 5 ML: 10 INJECTION, SOLUTION EPIDURAL; INFILTRATION; INTRACAUDAL; PERINEURAL at 13:37

## 2024-05-16 NOTE — PROGRESS NOTES
Patient Name: Charly Herron   : 1980     Date: 2024   Provider: June Guevara MD        PROCEDURE: Right knee intraarticular corticosteroid joint injection under fluoroscopic guidance    INDICATIONS: Charly Herron is a 43 y.o. male who presents with symptoms and physical exam findings consistent with right knee pain. He has had persistent pain that limits his activities of daily living such as lower body dressing. The pain is persistent despite conservative measures including home exercise program. Given his symptoms, physical exam findings, impairment in activities of daily living, and lack of response to conservative measures, consideration for Right knee corticosteroid joint injection under fluoroscopic guidance was given. Discussed the risks including but not limited to bleeding, infection, worsened pain, damage to surrounding structures, side effects, toxicity, allergic reactions to medications used, immune and stress-response dysfunction, fat necrosis, decreased bone mineralization, cartilage loss, need for premature joint replacement, increased fracture risk, avascular necrosis, skin pigmentation changes, blood sugar elevation, need for surgery, premature damage or degeneration of the joint, as well as catastrophic injury such as vision loss, paralysis, stroke, bowel or bladder incontinence, ventilator dependence, loss of use of the joint and/or extremity, and death. Discussed the risks, benefits, alternative procedures, and alternatives to the procedure including no procedure at all. Discussed that we cannot undo any permanent neurologic or orthopaedic damage or change the course of any underlying disease. After thorough discussion, patient expressed understanding and willingness to proceed. Written consent was obtained and is in the chart. Verbal consent to proceed was obtained.    Description of Procedure:  The site was marked. A time-out was performed. Fluoroscopy was used to

## 2024-07-15 ENCOUNTER — OFFICE VISIT (OUTPATIENT)
Dept: PAIN MANAGEMENT | Age: 44
End: 2024-07-15
Payer: MEDICARE

## 2024-07-15 VITALS
BODY MASS INDEX: 27.4 KG/M2 | WEIGHT: 185 LBS | TEMPERATURE: 97.4 F | DIASTOLIC BLOOD PRESSURE: 70 MMHG | SYSTOLIC BLOOD PRESSURE: 126 MMHG | HEIGHT: 69 IN

## 2024-07-15 DIAGNOSIS — M62.838 MUSCLE SPASM: ICD-10-CM

## 2024-07-15 DIAGNOSIS — M25.561 CHRONIC PAIN OF RIGHT KNEE: ICD-10-CM

## 2024-07-15 DIAGNOSIS — M47.817 LUMBOSACRAL SPONDYLOSIS WITHOUT MYELOPATHY: Primary | ICD-10-CM

## 2024-07-15 DIAGNOSIS — G89.29 CHRONIC PAIN OF RIGHT KNEE: ICD-10-CM

## 2024-07-15 PROCEDURE — G8419 CALC BMI OUT NRM PARAM NOF/U: HCPCS | Performed by: NURSE PRACTITIONER

## 2024-07-15 PROCEDURE — 99213 OFFICE O/P EST LOW 20 MIN: CPT | Performed by: NURSE PRACTITIONER

## 2024-07-15 PROCEDURE — 4004F PT TOBACCO SCREEN RCVD TLK: CPT | Performed by: NURSE PRACTITIONER

## 2024-07-15 PROCEDURE — G8427 DOCREV CUR MEDS BY ELIG CLIN: HCPCS | Performed by: NURSE PRACTITIONER

## 2024-07-15 RX ORDER — TIZANIDINE 4 MG/1
4 TABLET ORAL NIGHTLY PRN
Qty: 90 TABLET | Refills: 0 | Status: SHIPPED | OUTPATIENT
Start: 2024-07-20 | End: 2024-10-18

## 2024-07-15 RX ORDER — GABAPENTIN 600 MG/1
600 TABLET ORAL 4 TIMES DAILY
Qty: 360 TABLET | Refills: 0 | Status: SHIPPED | OUTPATIENT
Start: 2024-07-20 | End: 2024-10-18

## 2024-07-15 ASSESSMENT — ENCOUNTER SYMPTOMS
DIARRHEA: 0
NAUSEA: 0
SHORTNESS OF BREATH: 0
EYES NEGATIVE: 1
CONSTIPATION: 0
COUGH: 0
GASTROINTESTINAL NEGATIVE: 1
BACK PAIN: 1

## 2024-07-15 NOTE — PROGRESS NOTES
Charly Herron  (1980)    7/15/2024    Subjective:     Charly Herron is 43 y.o. male who complains today of:    Chief Complaint   Patient presents with    Follow-up    Back Pain     Lower right          Allergies:  Patient has no known allergies.    Past Medical History:   Diagnosis Date    Chronic back pain      Past Surgical History:   Procedure Laterality Date    HERNIA REPAIR      NECK SURGERY      tumor removed from neck     Family History   Problem Relation Age of Onset    Heart Disease Mother      Social History     Socioeconomic History    Marital status: Single     Spouse name: Not on file    Number of children: Not on file    Years of education: Not on file    Highest education level: Not on file   Occupational History    Not on file   Tobacco Use    Smoking status: Every Day     Current packs/day: 1.00     Average packs/day: 1 pack/day for 10.0 years (10.0 ttl pk-yrs)     Types: Cigarettes    Smokeless tobacco: Never    Tobacco comments:     patient stated he is trying to quit   Substance and Sexual Activity    Alcohol use: Yes     Alcohol/week: 0.0 standard drinks of alcohol     Comment: RARELY    Drug use: Not on file    Sexual activity: Not on file   Other Topics Concern    Not on file   Social History Narrative    Not on file     Social Determinants of Health     Financial Resource Strain: Not on file   Food Insecurity: Not on file   Transportation Needs: Not on file   Physical Activity: Not on file   Stress: Not on file   Social Connections: Not on file   Intimate Partner Violence: Not on file   Housing Stability: Not on file       Current Outpatient Medications on File Prior to Visit   Medication Sig Dispense Refill    Tens Unit MISC by Does not apply route Apply to low back as directed. 1 each 0    meloxicam (MOBIC) 7.5 MG tablet Take 2 tablets by mouth daily 30 tablet 0    VENTOLIN  (90 Base) MCG/ACT inhaler INHALE 2 PUFFS EVERY 4 TO 6 HOURS AS NEEDED  2     Current

## 2024-08-13 ENCOUNTER — OFFICE VISIT (OUTPATIENT)
Dept: PAIN MANAGEMENT | Age: 44
End: 2024-08-13
Payer: MEDICARE

## 2024-08-13 VITALS
TEMPERATURE: 97.5 F | SYSTOLIC BLOOD PRESSURE: 116 MMHG | HEIGHT: 69 IN | BODY MASS INDEX: 27.4 KG/M2 | DIASTOLIC BLOOD PRESSURE: 64 MMHG | WEIGHT: 185 LBS

## 2024-08-13 DIAGNOSIS — M47.817 LUMBOSACRAL SPONDYLOSIS WITHOUT MYELOPATHY: ICD-10-CM

## 2024-08-13 PROCEDURE — 4004F PT TOBACCO SCREEN RCVD TLK: CPT | Performed by: NURSE PRACTITIONER

## 2024-08-13 PROCEDURE — G8419 CALC BMI OUT NRM PARAM NOF/U: HCPCS | Performed by: NURSE PRACTITIONER

## 2024-08-13 PROCEDURE — G8427 DOCREV CUR MEDS BY ELIG CLIN: HCPCS | Performed by: NURSE PRACTITIONER

## 2024-08-13 PROCEDURE — 99214 OFFICE O/P EST MOD 30 MIN: CPT | Performed by: NURSE PRACTITIONER

## 2024-08-13 ASSESSMENT — ENCOUNTER SYMPTOMS
EYES NEGATIVE: 1
SHORTNESS OF BREATH: 0
COUGH: 0
DIARRHEA: 0
CONSTIPATION: 0
GASTROINTESTINAL NEGATIVE: 1
NAUSEA: 0
BACK PAIN: 1

## 2024-08-13 NOTE — PROGRESS NOTES
horizontal tear posterior horn of medial meniscus.   XR LS Spine 8/10/16: 5 lumbar vertebrae, no fracture, min degen changes.  XR LS SPine 1/13/15: narrowing L4/5 level. Minimal degen changes  MRI LS Spine 3/13/15: disc narrowing and bulging at L4/5, degenerative changes also noted at L5/S1.   EMG B LE 3/1/16: This study is normal. There is no current electrodiagnostic evidence for active lumbosacral motor radiculopathy, lumbosacral plexopathy, or generalized large fiber sensorimotor peripheral polyneuropathy.  -Methocarbamol Robaxin 1500mg BID, Gabapentin 600mg QID min relief. Min relief Meloxicam 15mg. side effects Tizanidine 2mg. Min relief Magnesium 400mg daily. EMLA oint min relief    Objective:     Vitals:  /64 (Site: Left Upper Arm, Position: Sitting)   Temp 97.5 °F (36.4 °C)   Ht 1.753 m (5' 9\")   Wt 83.9 kg (185 lb)   BMI 27.32 kg/m² Pain Score:  10 - Worst pain ever      Physical Exam  Vitals and nursing note reviewed.       This is a pleasant male who answers questions appropriately and follows commands.  Pt is alert and oriented x 3. Recent and remote memory is intact.  Mood and affect, judgement and insight are normal.  No signs of distress, no dyspnea or SOB noted. HEENT: PERRL.  Neck is supple, trachea midline.  No lymphadenopathy noted.   Decreased ROM with flexion and extension of low back.  TTP to lumbar spine and surrounding paraspinal muscles with positive provacative maneuvers. Negative SLR.  Tightness in both hamstrings noted.  Pt is able to briefly heel walk and toe walk.   Balance and coordination normal.  Strength is functional for ambulation. Full ROM Rt knee and non TTP today.   Cranial nerves II-XII are intact.       Assessment:      Diagnosis Orders   1. Lumbosacral spondylosis without myelopathy  DSTR NROLYTC AGNT PARVERTEB FCT SNGL LMBR/SACRAL    DSTR NROLYTC AGNT PARVERTEB FCT ADDL LMBR/SACRAL          Plan:     Periodic Controlled Substance Monitoring: No signs of potential

## 2024-08-14 ENCOUNTER — TELEPHONE (OUTPATIENT)
Dept: PAIN MANAGEMENT | Age: 44
End: 2024-08-14

## 2024-08-14 NOTE — TELEPHONE ENCOUNTER
WES L345 RFA    AUTH APPROVED FROM 8/29/24-11/28/24  REFERRAL # [id number]  OK to schedule procedure approved as above.   Please note sides/levels approved and date range.   (If applicable, sides/levels approved may differ from those ordered)    TO BE SCHEDULED WITH

## 2024-08-14 NOTE — TELEPHONE ENCOUNTER
Left voicemail for the patient to schedule procedure.   (KASI L345 RFA- AUTH APPROVED 8/29/24-11/28/24)

## 2024-08-23 ENCOUNTER — TELEPHONE (OUTPATIENT)
Age: 44
End: 2024-08-23

## 2024-09-12 ENCOUNTER — OFFICE VISIT (OUTPATIENT)
Age: 44
End: 2024-09-12
Payer: MEDICARE

## 2024-09-12 VITALS
DIASTOLIC BLOOD PRESSURE: 70 MMHG | HEART RATE: 111 BPM | WEIGHT: 185 LBS | TEMPERATURE: 97.8 F | HEIGHT: 69 IN | OXYGEN SATURATION: 98 % | BODY MASS INDEX: 27.4 KG/M2 | SYSTOLIC BLOOD PRESSURE: 120 MMHG

## 2024-09-12 DIAGNOSIS — M47.817 LUMBOSACRAL SPONDYLOSIS WITHOUT MYELOPATHY: Primary | ICD-10-CM

## 2024-09-12 PROCEDURE — 64636 DESTROY L/S FACET JNT ADDL: CPT | Performed by: PHYSICAL MEDICINE & REHABILITATION

## 2024-09-12 PROCEDURE — 64635 DESTROY LUMB/SAC FACET JNT: CPT | Performed by: PHYSICAL MEDICINE & REHABILITATION

## 2024-09-12 RX ORDER — BUPIVACAINE HYDROCHLORIDE 5 MG/ML
0.5 INJECTION, SOLUTION PERINEURAL ONCE
Status: COMPLETED | OUTPATIENT
Start: 2024-09-12 | End: 2024-09-12

## 2024-09-12 RX ORDER — LIDOCAINE HYDROCHLORIDE 10 MG/ML
12 INJECTION, SOLUTION EPIDURAL; INFILTRATION; INTRACAUDAL; PERINEURAL ONCE
Status: COMPLETED | OUTPATIENT
Start: 2024-09-12 | End: 2024-09-12

## 2024-09-12 RX ORDER — BETAMETHASONE SODIUM PHOSPHATE AND BETAMETHASONE ACETATE 3; 3 MG/ML; MG/ML
3 INJECTION, SUSPENSION INTRA-ARTICULAR; INTRALESIONAL; INTRAMUSCULAR; SOFT TISSUE ONCE
Status: COMPLETED | OUTPATIENT
Start: 2024-09-12 | End: 2024-09-12

## 2024-09-12 RX ADMIN — BETAMETHASONE SODIUM PHOSPHATE AND BETAMETHASONE ACETATE 3 MG: 3; 3 INJECTION, SUSPENSION INTRA-ARTICULAR; INTRALESIONAL; INTRAMUSCULAR at 13:42

## 2024-09-12 RX ADMIN — LIDOCAINE HYDROCHLORIDE 12 ML: 10 INJECTION, SOLUTION EPIDURAL; INFILTRATION; INTRACAUDAL; PERINEURAL at 13:44

## 2024-09-12 RX ADMIN — Medication 1 MEQ: at 13:44

## 2024-09-12 RX ADMIN — BUPIVACAINE HYDROCHLORIDE 2.5 MG: 5 INJECTION, SOLUTION PERINEURAL at 13:43

## 2024-10-15 ENCOUNTER — OFFICE VISIT (OUTPATIENT)
Age: 44
End: 2024-10-15
Payer: MEDICARE

## 2024-10-15 VITALS
TEMPERATURE: 99.5 F | HEIGHT: 67 IN | SYSTOLIC BLOOD PRESSURE: 118 MMHG | DIASTOLIC BLOOD PRESSURE: 80 MMHG | WEIGHT: 185 LBS | BODY MASS INDEX: 29.03 KG/M2

## 2024-10-15 DIAGNOSIS — M62.838 MUSCLE SPASM: ICD-10-CM

## 2024-10-15 DIAGNOSIS — M47.817 LUMBOSACRAL SPONDYLOSIS WITHOUT MYELOPATHY: ICD-10-CM

## 2024-10-15 DIAGNOSIS — M25.561 CHRONIC PAIN OF RIGHT KNEE: ICD-10-CM

## 2024-10-15 DIAGNOSIS — G89.29 CHRONIC PAIN OF RIGHT KNEE: ICD-10-CM

## 2024-10-15 PROCEDURE — 4004F PT TOBACCO SCREEN RCVD TLK: CPT | Performed by: NURSE PRACTITIONER

## 2024-10-15 PROCEDURE — G8427 DOCREV CUR MEDS BY ELIG CLIN: HCPCS | Performed by: NURSE PRACTITIONER

## 2024-10-15 PROCEDURE — G8419 CALC BMI OUT NRM PARAM NOF/U: HCPCS | Performed by: NURSE PRACTITIONER

## 2024-10-15 PROCEDURE — G8484 FLU IMMUNIZE NO ADMIN: HCPCS | Performed by: NURSE PRACTITIONER

## 2024-10-15 PROCEDURE — 99212 OFFICE O/P EST SF 10 MIN: CPT

## 2024-10-15 PROCEDURE — 99214 OFFICE O/P EST MOD 30 MIN: CPT | Performed by: NURSE PRACTITIONER

## 2024-10-15 RX ORDER — GABAPENTIN 600 MG/1
600 TABLET ORAL 4 TIMES DAILY
Qty: 360 TABLET | Refills: 0 | Status: SHIPPED | OUTPATIENT
Start: 2024-10-15 | End: 2025-01-13

## 2024-10-15 ASSESSMENT — ENCOUNTER SYMPTOMS
SHORTNESS OF BREATH: 0
DIARRHEA: 0
GASTROINTESTINAL NEGATIVE: 1
CONSTIPATION: 0
COUGH: 0
EYES NEGATIVE: 1
NAUSEA: 0
BACK PAIN: 1

## 2024-10-15 NOTE — PROGRESS NOTES
Charly Herron  (1980)    10/15/2024    Subjective:     Charly Herron is 44 y.o. male who complains today of:    Chief Complaint   Patient presents with    Follow-up    Leg Pain     Left , radiates down left leg         Allergies:  Patient has no known allergies.    Past Medical History:   Diagnosis Date    Chronic back pain      Past Surgical History:   Procedure Laterality Date    HERNIA REPAIR      NECK SURGERY      tumor removed from neck     Family History   Problem Relation Age of Onset    Heart Disease Mother      Social History     Socioeconomic History    Marital status: Single     Spouse name: Not on file    Number of children: Not on file    Years of education: Not on file    Highest education level: Not on file   Occupational History    Not on file   Tobacco Use    Smoking status: Every Day     Current packs/day: 1.00     Average packs/day: 1 pack/day for 10.0 years (10.0 ttl pk-yrs)     Types: Cigarettes    Smokeless tobacco: Never    Tobacco comments:     patient stated he is trying to quit   Substance and Sexual Activity    Alcohol use: Yes     Alcohol/week: 0.0 standard drinks of alcohol     Comment: RARELY    Drug use: Not on file    Sexual activity: Not on file   Other Topics Concern    Not on file   Social History Narrative    Not on file     Social Determinants of Health     Financial Resource Strain: Not on file   Food Insecurity: Not on file   Transportation Needs: Not on file   Physical Activity: Not on file   Stress: Not on file   Social Connections: Not on file   Intimate Partner Violence: Not on file   Housing Stability: Not on file       Current Outpatient Medications on File Prior to Visit   Medication Sig Dispense Refill    Tens Unit MISC by Does not apply route Apply to low back as directed. 1 each 0    meloxicam (MOBIC) 7.5 MG tablet Take 2 tablets by mouth daily 30 tablet 0    VENTOLIN  (90 Base) MCG/ACT inhaler INHALE 2 PUFFS EVERY 4 TO 6 HOURS AS NEEDED  2

## 2024-11-12 DIAGNOSIS — M47.817 LUMBOSACRAL SPONDYLOSIS WITHOUT MYELOPATHY: ICD-10-CM

## 2024-11-12 RX ORDER — GABAPENTIN 600 MG/1
600 TABLET ORAL 4 TIMES DAILY
Qty: 240 TABLET | OUTPATIENT
Start: 2024-11-12

## 2024-11-19 DIAGNOSIS — M62.838 MUSCLE SPASM: ICD-10-CM

## 2024-11-19 NOTE — TELEPHONE ENCOUNTER
If pain is changing would request patient be seen to evaluate further to see what is needed.  Use medication as directed.

## 2024-11-19 NOTE — TELEPHONE ENCOUNTER
Requested Prescriptions     Pending Prescriptions Disp Refills    tiZANidine (ZANAFLEX) 4 MG tablet 90 tablet 0     Sig: Take 1 tablet by mouth nightly as needed (spasm)       Patient last seen on:  10/15/2024      Date of last refill:  10/15/2024 90 DAY SUPPLY, 1 TABLET BY MOUTH NIGHTLY AS NEEDED    Reason for request:  PATIENT LVM REQUESTING REFILL FOR MUSCLE RELAXER, PATIENT STATED HE IS HAVING A LOT OF MUSCLE SPASMS THAT FEEL LIKE CHARLEY HORSES IN HIS LEGS. PATIENT STATED HE IS RUNNING LOW ON MEDICATION. PATIENT WANTS TO KNOW IF HE CAN GET A REFILL TO TAKE 2 OR 3 TIMES A DAY FOR THESE MUSCLE SPASMS    Request date for pharmacy pick-up:  11/19/24    Next office visit date: 1/14/2025    Patient has no known allergies.

## 2024-11-26 ENCOUNTER — OFFICE VISIT (OUTPATIENT)
Age: 44
End: 2024-11-26
Payer: MEDICARE

## 2024-11-26 VITALS
TEMPERATURE: 97.5 F | BODY MASS INDEX: 29.03 KG/M2 | HEIGHT: 67 IN | WEIGHT: 185 LBS | SYSTOLIC BLOOD PRESSURE: 120 MMHG | DIASTOLIC BLOOD PRESSURE: 80 MMHG

## 2024-11-26 DIAGNOSIS — M62.838 MUSCLE SPASM: ICD-10-CM

## 2024-11-26 DIAGNOSIS — M47.817 LUMBOSACRAL SPONDYLOSIS WITHOUT MYELOPATHY: Primary | ICD-10-CM

## 2024-11-26 PROCEDURE — G8419 CALC BMI OUT NRM PARAM NOF/U: HCPCS | Performed by: NURSE PRACTITIONER

## 2024-11-26 PROCEDURE — G8484 FLU IMMUNIZE NO ADMIN: HCPCS | Performed by: NURSE PRACTITIONER

## 2024-11-26 PROCEDURE — 99214 OFFICE O/P EST MOD 30 MIN: CPT | Performed by: NURSE PRACTITIONER

## 2024-11-26 PROCEDURE — 4004F PT TOBACCO SCREEN RCVD TLK: CPT | Performed by: NURSE PRACTITIONER

## 2024-11-26 PROCEDURE — 99213 OFFICE O/P EST LOW 20 MIN: CPT | Performed by: NURSE PRACTITIONER

## 2024-11-26 PROCEDURE — G8427 DOCREV CUR MEDS BY ELIG CLIN: HCPCS | Performed by: NURSE PRACTITIONER

## 2024-11-26 RX ORDER — METHYLPREDNISOLONE 4 MG/1
TABLET ORAL
Qty: 2 KIT | Refills: 0 | Status: SHIPPED | OUTPATIENT
Start: 2024-11-26 | End: 2024-11-26 | Stop reason: CLARIF

## 2024-11-26 RX ORDER — METHYLPREDNISOLONE 4 MG/1
TABLET ORAL
Qty: 1 KIT | Refills: 0 | Status: SHIPPED | OUTPATIENT
Start: 2024-11-26 | End: 2024-12-02

## 2024-11-26 ASSESSMENT — ENCOUNTER SYMPTOMS
EYES NEGATIVE: 1
GASTROINTESTINAL NEGATIVE: 1
COUGH: 0
DIARRHEA: 0
NAUSEA: 0
CONSTIPATION: 0
BACK PAIN: 1
SHORTNESS OF BREATH: 0

## 2024-11-26 NOTE — PROGRESS NOTES
Charly Herron  (1980)    11/26/2024    Subjective:     Charly Herron is 44 y.o. male who complains today of:    Chief Complaint   Patient presents with    Back Pain     Lower back    Foot Pain     Left foot         Allergies:  Patient has no known allergies.    Past Medical History:   Diagnosis Date    Chronic back pain      Past Surgical History:   Procedure Laterality Date    HERNIA REPAIR      NECK SURGERY      tumor removed from neck     Family History   Problem Relation Age of Onset    Heart Disease Mother      Social History     Socioeconomic History    Marital status: Single     Spouse name: Not on file    Number of children: Not on file    Years of education: Not on file    Highest education level: Not on file   Occupational History    Not on file   Tobacco Use    Smoking status: Every Day     Current packs/day: 1.00     Average packs/day: 1 pack/day for 10.0 years (10.0 ttl pk-yrs)     Types: Cigarettes    Smokeless tobacco: Never    Tobacco comments:     patient stated he is trying to quit   Substance and Sexual Activity    Alcohol use: Yes     Alcohol/week: 0.0 standard drinks of alcohol     Comment: RARELY    Drug use: Not on file    Sexual activity: Not on file   Other Topics Concern    Not on file   Social History Narrative    Not on file     Social Determinants of Health     Financial Resource Strain: Not on file   Food Insecurity: Not on file   Transportation Needs: Not on file   Physical Activity: Not on file   Stress: Not on file   Social Connections: Not on file   Intimate Partner Violence: Not on file   Housing Stability: Not on file       Current Outpatient Medications on File Prior to Visit   Medication Sig Dispense Refill    gabapentin (NEURONTIN) 600 MG tablet Take 1 tablet by mouth 4 times daily for 90 days. 360 tablet 0    diclofenac sodium (VOLTAREN) 1 % GEL Apply 2 g topically 4 times daily as needed for Pain 200 g 2    Tens Unit MISC by Does not apply route Apply to

## 2024-12-13 DIAGNOSIS — M62.838 MUSCLE SPASM: ICD-10-CM

## 2024-12-26 DIAGNOSIS — M62.838 MUSCLE SPASM: ICD-10-CM

## 2024-12-26 NOTE — TELEPHONE ENCOUNTER
Office note 11/26/2024 reviewed, Tizanidine 4 mg #60 refills.  Will refill Zanaflex 4 mg and mild increase to twice daily as needed spasms for just 1 month.  Office note 10/15/2024 reviewed, tizanidine 4 mg nightly as needed 90 tablets for 90 days    -Reduce tizanidine 4 mg from BID #60/month down to daily qHS prn #30/month, no ref start 12/26/24. Avoid all other muscle relaxers and/or sedating medicines.  -Please schedule follow-up with pain management

## 2024-12-26 NOTE — TELEPHONE ENCOUNTER
Requested Prescriptions     Pending Prescriptions Disp Refills    tiZANidine (ZANAFLEX) 4 MG tablet 60 tablet 0     Sig: Take 1 tablet by mouth 2 times daily as needed (spasm) Increase to BID PRN spasms flare.       Patient last seen on:  11/26/2024    Date of last refill:  11/26/24    Reason for request:  patient lvm requesting refill    Request date for pharmacy pick-up:  12/26/2024    Next office visit date: Visit date not found    Patient has no known allergies.

## 2024-12-27 NOTE — TELEPHONE ENCOUNTER
TRIED TO CALL PATIENT REGARDING DR ZAMORA'S RESPONSE. UNABLE TO LEAVE MESSAGE DUE TO MAILBOX NOT BEING SET UP.

## 2025-02-04 ENCOUNTER — OFFICE VISIT (OUTPATIENT)
Age: 45
End: 2025-02-04
Payer: MEDICARE

## 2025-02-04 VITALS
SYSTOLIC BLOOD PRESSURE: 126 MMHG | HEIGHT: 69 IN | TEMPERATURE: 97.7 F | BODY MASS INDEX: 27.4 KG/M2 | OXYGEN SATURATION: 100 % | WEIGHT: 185 LBS | DIASTOLIC BLOOD PRESSURE: 78 MMHG | HEART RATE: 89 BPM

## 2025-02-04 DIAGNOSIS — M47.817 LUMBOSACRAL SPONDYLOSIS WITHOUT MYELOPATHY: ICD-10-CM

## 2025-02-04 DIAGNOSIS — G89.29 CHRONIC PAIN OF RIGHT KNEE: ICD-10-CM

## 2025-02-04 DIAGNOSIS — M62.838 MUSCLE SPASM: ICD-10-CM

## 2025-02-04 DIAGNOSIS — M25.561 CHRONIC PAIN OF RIGHT KNEE: ICD-10-CM

## 2025-02-04 PROCEDURE — 99212 OFFICE O/P EST SF 10 MIN: CPT | Performed by: NURSE PRACTITIONER

## 2025-02-04 PROCEDURE — 99214 OFFICE O/P EST MOD 30 MIN: CPT | Performed by: NURSE PRACTITIONER

## 2025-02-04 PROCEDURE — G8427 DOCREV CUR MEDS BY ELIG CLIN: HCPCS | Performed by: NURSE PRACTITIONER

## 2025-02-04 PROCEDURE — G8419 CALC BMI OUT NRM PARAM NOF/U: HCPCS | Performed by: NURSE PRACTITIONER

## 2025-02-04 PROCEDURE — 4004F PT TOBACCO SCREEN RCVD TLK: CPT | Performed by: NURSE PRACTITIONER

## 2025-02-04 RX ORDER — GABAPENTIN 600 MG/1
600 TABLET ORAL 4 TIMES DAILY
Qty: 360 TABLET | Refills: 0 | Status: SHIPPED | OUTPATIENT
Start: 2025-02-04 | End: 2025-05-05

## 2025-02-04 ASSESSMENT — ENCOUNTER SYMPTOMS
GASTROINTESTINAL NEGATIVE: 1
EYES NEGATIVE: 1
COUGH: 0
BACK PAIN: 1
SHORTNESS OF BREATH: 0
NAUSEA: 0
CONSTIPATION: 0
DIARRHEA: 0

## 2025-02-04 NOTE — PROGRESS NOTES
Charly Herron  (1980)    2/4/2025    Subjective:     Charly Herron is 44 y.o. male who complains today of:    Chief Complaint   Patient presents with    Follow-up         Allergies:  Patient has no known allergies.    Past Medical History:   Diagnosis Date    Chronic back pain      Past Surgical History:   Procedure Laterality Date    HERNIA REPAIR      NECK SURGERY      tumor removed from neck     Family History   Problem Relation Age of Onset    Heart Disease Mother      Social History     Socioeconomic History    Marital status: Single     Spouse name: Not on file    Number of children: Not on file    Years of education: Not on file    Highest education level: Not on file   Occupational History    Not on file   Tobacco Use    Smoking status: Every Day     Current packs/day: 1.00     Average packs/day: 1 pack/day for 10.0 years (10.0 ttl pk-yrs)     Types: Cigarettes    Smokeless tobacco: Never    Tobacco comments:     patient stated he is trying to quit   Substance and Sexual Activity    Alcohol use: Yes     Alcohol/week: 0.0 standard drinks of alcohol     Comment: RARELY    Drug use: Not on file    Sexual activity: Not on file   Other Topics Concern    Not on file   Social History Narrative    Not on file     Social Determinants of Health     Financial Resource Strain: Not on file   Food Insecurity: Not on file   Transportation Needs: Not on file   Physical Activity: Not on file   Stress: Not on file   Social Connections: Not on file   Intimate Partner Violence: Not on file   Housing Stability: Not on file       Current Outpatient Medications on File Prior to Visit   Medication Sig Dispense Refill    Tens Unit MISC by Does not apply route Apply to low back as directed. 1 each 0    meloxicam (MOBIC) 7.5 MG tablet Take 2 tablets by mouth daily 30 tablet 0    VENTOLIN  (90 Base) MCG/ACT inhaler INHALE 2 PUFFS EVERY 4 TO 6 HOURS AS NEEDED  2     Current Facility-Administered Medications

## 2025-04-03 ENCOUNTER — TELEPHONE (OUTPATIENT)
Age: 45
End: 2025-04-03

## 2025-04-03 NOTE — TELEPHONE ENCOUNTER
Patient called because he has questions about his medications, but he did not mention any specific medication, and is requesting a call back.

## 2025-04-09 ENCOUNTER — OFFICE VISIT (OUTPATIENT)
Age: 45
End: 2025-04-09
Payer: MEDICARE

## 2025-04-09 VITALS
BODY MASS INDEX: 27.4 KG/M2 | DIASTOLIC BLOOD PRESSURE: 80 MMHG | WEIGHT: 185 LBS | SYSTOLIC BLOOD PRESSURE: 120 MMHG | HEART RATE: 106 BPM | HEIGHT: 69 IN | OXYGEN SATURATION: 99 % | TEMPERATURE: 97.9 F

## 2025-04-09 DIAGNOSIS — Z76.89 ENCOUNTER TO ESTABLISH CARE: ICD-10-CM

## 2025-04-09 DIAGNOSIS — M47.817 LUMBOSACRAL SPONDYLOSIS WITHOUT MYELOPATHY: Primary | ICD-10-CM

## 2025-04-09 DIAGNOSIS — M48.061 LUMBAR FORAMINAL STENOSIS: ICD-10-CM

## 2025-04-09 DIAGNOSIS — M62.838 MUSCLE SPASM: ICD-10-CM

## 2025-04-09 PROCEDURE — G8427 DOCREV CUR MEDS BY ELIG CLIN: HCPCS | Performed by: NURSE PRACTITIONER

## 2025-04-09 PROCEDURE — 4004F PT TOBACCO SCREEN RCVD TLK: CPT | Performed by: NURSE PRACTITIONER

## 2025-04-09 PROCEDURE — G8419 CALC BMI OUT NRM PARAM NOF/U: HCPCS | Performed by: NURSE PRACTITIONER

## 2025-04-09 PROCEDURE — 99214 OFFICE O/P EST MOD 30 MIN: CPT | Performed by: NURSE PRACTITIONER

## 2025-04-09 RX ORDER — GABAPENTIN 600 MG/1
600 TABLET ORAL 4 TIMES DAILY
Qty: 360 TABLET | Refills: 0 | Status: CANCELLED | OUTPATIENT
Start: 2025-04-09 | End: 2025-07-08

## 2025-04-09 ASSESSMENT — ENCOUNTER SYMPTOMS
BACK PAIN: 1
NAUSEA: 0
DIARRHEA: 0
GASTROINTESTINAL NEGATIVE: 1
COUGH: 0
CONSTIPATION: 0
SHORTNESS OF BREATH: 0

## 2025-04-09 NOTE — PROGRESS NOTES
day and Voltaren gel if needed.  Again we will temporarily increase tizanidine 4 mg to twice daily as needed spasms and advised patient do not want to keep this long-term twice a day.  He uses THC as well.  He is having more low back pain.We will go ahead and order  bilateral L3, L4, L5 RF ablation under fluoroscopy to treat axial back pain with Dr. Guevara as pt has had >80% pain relief with diagnostic MBB's and improvement with past RF ablations under fluoroscopy for 6 months or greater. Other causes of pain such as tumor and fracture has been ruled out.  Lumbar radicular pain has been ruled out.  he has failed conservative treatment in the past. Anatomic model of pathology was shown. Risks and benefits of the procedure were discussed. All questions were answered and patient understands and agrees with the plan.  Discussed home exercise program.  Relevant imaging and pain generators reviewed. Pt verbalized understanding and agrees with above plan. Pt has chronic pain.       Will continue medications for chronic pain that has been previously directed as they do help pt function with ADL and improve quality of life.  NDP reviewed.  OARRS was reviewed.      Follow up:  Return in about 4 weeks (around 5/7/2025) for review meds and reassess pain.    Barby Garcia, APRN - CNP

## 2025-04-10 ENCOUNTER — TELEPHONE (OUTPATIENT)
Age: 45
End: 2025-04-10

## 2025-04-10 NOTE — TELEPHONE ENCOUNTER
Call placed to patient Radiofrequency scheduled.  B/L L3,4,5 RFA   AUTH # [id number] VALID 4/11/2025-7/11/2025

## 2025-04-10 NOTE — TELEPHONE ENCOUNTER
B/L L3,4,5 RFA   AUTH # [id number] VALID 4/11/2025-7/11/2025  REFERRAL#  [id number]    OK to schedule procedure approved as above.   Please note sides/levels approved and date range.   (If applicable, sides/levels approved may differ from those ordered)    TO BE SCHEDULED WITH DR ZAMORA

## 2025-05-06 ENCOUNTER — OFFICE VISIT (OUTPATIENT)
Age: 45
End: 2025-05-06
Payer: MEDICARE

## 2025-05-06 VITALS
OXYGEN SATURATION: 98 % | TEMPERATURE: 98.6 F | DIASTOLIC BLOOD PRESSURE: 86 MMHG | SYSTOLIC BLOOD PRESSURE: 152 MMHG | HEART RATE: 102 BPM | BODY MASS INDEX: 27.4 KG/M2 | WEIGHT: 185 LBS | HEIGHT: 69 IN

## 2025-05-06 DIAGNOSIS — M25.561 CHRONIC PAIN OF RIGHT KNEE: ICD-10-CM

## 2025-05-06 DIAGNOSIS — G89.29 CHRONIC PAIN OF RIGHT KNEE: ICD-10-CM

## 2025-05-06 DIAGNOSIS — M47.817 LUMBOSACRAL SPONDYLOSIS WITHOUT MYELOPATHY: Primary | ICD-10-CM

## 2025-05-06 DIAGNOSIS — M62.838 MUSCLE SPASM: ICD-10-CM

## 2025-05-06 DIAGNOSIS — Z76.89 ENCOUNTER TO ESTABLISH CARE: ICD-10-CM

## 2025-05-06 PROCEDURE — 64635 DESTROY LUMB/SAC FACET JNT: CPT | Performed by: PHYSICAL MEDICINE & REHABILITATION

## 2025-05-06 PROCEDURE — 64636 DESTROY L/S FACET JNT ADDL: CPT | Performed by: PHYSICAL MEDICINE & REHABILITATION

## 2025-05-06 PROCEDURE — 99214 OFFICE O/P EST MOD 30 MIN: CPT | Performed by: PHYSICAL MEDICINE & REHABILITATION

## 2025-05-06 RX ORDER — INDOMETHACIN 25 MG/1
1 CAPSULE ORAL ONCE
Status: COMPLETED | OUTPATIENT
Start: 2025-05-06 | End: 2025-05-06

## 2025-05-06 RX ORDER — GABAPENTIN 600 MG/1
600 TABLET ORAL 4 TIMES DAILY
Qty: 360 TABLET | Refills: 0 | Status: SHIPPED | OUTPATIENT
Start: 2025-05-06 | End: 2025-08-04

## 2025-05-06 RX ORDER — LIDOCAINE HYDROCHLORIDE 10 MG/ML
14 INJECTION, SOLUTION EPIDURAL; INFILTRATION; INTRACAUDAL; PERINEURAL ONCE
Status: COMPLETED | OUTPATIENT
Start: 2025-05-06 | End: 2025-05-06

## 2025-05-06 RX ORDER — BETAMETHASONE SODIUM PHOSPHATE AND BETAMETHASONE ACETATE 3; 3 MG/ML; MG/ML
6 INJECTION, SUSPENSION INTRA-ARTICULAR; INTRALESIONAL; INTRAMUSCULAR; SOFT TISSUE ONCE
Status: COMPLETED | OUTPATIENT
Start: 2025-05-06 | End: 2025-05-06

## 2025-05-06 RX ADMIN — BETAMETHASONE SODIUM PHOSPHATE AND BETAMETHASONE ACETATE 6 MG: 3; 3 INJECTION, SUSPENSION INTRA-ARTICULAR; INTRALESIONAL; INTRAMUSCULAR at 12:03

## 2025-05-06 RX ADMIN — INDOMETHACIN 1 MEQ: 25 CAPSULE ORAL at 12:04

## 2025-05-06 RX ADMIN — LIDOCAINE HYDROCHLORIDE 14 ML: 10 INJECTION, SOLUTION EPIDURAL; INFILTRATION; INTRACAUDAL; PERINEURAL at 12:04

## 2025-05-06 ASSESSMENT — ENCOUNTER SYMPTOMS
SHORTNESS OF BREATH: 0
DIARRHEA: 0
BACK PAIN: 1
CONSTIPATION: 0
NAUSEA: 0

## 2025-05-06 ASSESSMENT — PAIN SCALES - GENERAL
PAINLEVEL_OUTOF10: 7
PAINLEVEL_OUTOF10: 7

## 2025-05-06 ASSESSMENT — PAIN DESCRIPTION - LOCATION
LOCATION: BACK
LOCATION: BACK

## 2025-05-06 NOTE — PROGRESS NOTES
Charly Herron  (1980)    2025    Subjective:     Charly Herron is 44 y.o. male who complains today of:    Chief Complaint   Patient presents with    Back Pain     BILATERAL LUMBAR 3,4,5 RADIO FREQUENCY ABLATION     Has a difficult time obtaining transportation. Mother with dementia, father very ill and nearly , stressful time. Son graduating, plans to celebrate. Interested in cupping. Wants to establish with new primary care physician. No tests or updates mother physicians.  No emergency room visits.  Gabapentin 600 mg QID and Robaxin 750 mg BID help with remaining functional with chores, personal hygiene, remaining compliant with home exercise program, maintaining his quality of life, and performing activities of daily living. He obtains greater than 50% pain relief without any significant side effects. He is clear to avoid driving or operating heavy machinery or to perform any activities where he may harm himself or others while on pain medications.     Low back pain is a 6/10.  Gets up to an 9/10 on NRS pain scale.  Pain is located both sides low back. The pain is worse with activity and bending.  The pain is better with rest, pain medicine, and radiofrequency ablation.  He underwent bilateral lumbar L3-L4-L5 radiofrequency ablation on 2024 with greater than 50% pain relief for over 6 months with reduction in NRS pain scale from 9/10 down to 4/10.  The pain has been a constant ache for over 1 year. There are no other associated symptoms or contextual factors. He denies any classic radicular symptoms, new weakness, saddle anesthesia, bowel or bladder dysfunction, or falls.    Right knee pain is a 3/10.  Gets up to a 7/10 on NRS pain scale.  The pain is worse with walking and taking stairs.  The pain is better with rest and pain medicine.  Is been a constant ache for over 2 years.      Allergies:  Patient has no known allergies.    Past Medical History:   Diagnosis Date    Chronic back

## 2025-05-06 NOTE — PROGRESS NOTES
Lumbar Radiofrequency Ablation/Neurotomy          Patient Name: Charly Herron   : 1980  Date: 2025     Provider: June Guevara MD      Procedure: Bilateral Lumbar L4/5 L5/S1 radiofrequency ablation (B Lumbar L3 and L4 medial branch and L5 dorsal ramus)    Charly Herron is here today for interventional pain management. Preoperatively, the patient presents with symptoms and physical exam findings consistent with lumbar facet zygapophyseal joint mediated pain. He has pain in both sides of his low back. A focused physical exam reveals tenderness to palpation over the lower lumbar spinous processes from L2 down to sacrum with bilateral paraspinal muscle tenderness. Rotation and extension reproduces axial low back pain. Other facet provocative maneuvers are positive. Straight leg raise is negative bilaterally. He has obtained greater than 50% pain relief for over 6 months from prior radiofrequency ablation. His pain has returned in a similar distribution, character, and intensity necessitating repeat treatment.    He has had persistent pain that limits his function and activities of daily living. The pain is persistent despite conservative measures. He has significant functional and psychological impairment due to this condition. He has undergone diagnostic medial branch blocks with a positive diagnostic response. Given his symptoms, physical exam findings, impairment in activities of daily living, lack of response to conservative measures, and positive diagnostic response to medial branch blocks, consideration for lumbar facet/medial branch radiofrequency ablation/neurotomy was given. Discussed the risks of the procedure including, but not limited to, bleeding, infection, worsened pain, damage to surrounding structures, post-ablation neuritis, worsened paresthesias, side effects, toxicity, allergic reactions to medications used, immune and stress-response dysfunction, fat necrosis, avascular

## 2025-07-24 ENCOUNTER — OFFICE VISIT (OUTPATIENT)
Age: 45
End: 2025-07-24
Payer: MEDICARE

## 2025-07-24 ENCOUNTER — HOSPITAL ENCOUNTER (OUTPATIENT)
Dept: GENERAL RADIOLOGY | Age: 45
Discharge: HOME | End: 2025-07-26
Payer: MEDICARE

## 2025-07-24 VITALS
SYSTOLIC BLOOD PRESSURE: 120 MMHG | OXYGEN SATURATION: 98 % | HEIGHT: 69 IN | HEART RATE: 65 BPM | BODY MASS INDEX: 27.4 KG/M2 | DIASTOLIC BLOOD PRESSURE: 80 MMHG | WEIGHT: 185 LBS | TEMPERATURE: 97.9 F

## 2025-07-24 DIAGNOSIS — M25.512 CHRONIC LEFT SHOULDER PAIN: Primary | ICD-10-CM

## 2025-07-24 DIAGNOSIS — M25.512 CHRONIC LEFT SHOULDER PAIN: ICD-10-CM

## 2025-07-24 DIAGNOSIS — M62.838 MUSCLE SPASM: ICD-10-CM

## 2025-07-24 DIAGNOSIS — G89.29 CHRONIC LEFT SHOULDER PAIN: ICD-10-CM

## 2025-07-24 DIAGNOSIS — G89.29 CHRONIC LEFT SHOULDER PAIN: Primary | ICD-10-CM

## 2025-07-24 DIAGNOSIS — M47.817 LUMBOSACRAL SPONDYLOSIS WITHOUT MYELOPATHY: ICD-10-CM

## 2025-07-24 PROCEDURE — 73030 X-RAY EXAM OF SHOULDER: CPT

## 2025-07-24 PROCEDURE — G8427 DOCREV CUR MEDS BY ELIG CLIN: HCPCS | Performed by: NURSE PRACTITIONER

## 2025-07-24 PROCEDURE — 99214 OFFICE O/P EST MOD 30 MIN: CPT | Performed by: NURSE PRACTITIONER

## 2025-07-24 PROCEDURE — 4004F PT TOBACCO SCREEN RCVD TLK: CPT | Performed by: NURSE PRACTITIONER

## 2025-07-24 PROCEDURE — G8419 CALC BMI OUT NRM PARAM NOF/U: HCPCS | Performed by: NURSE PRACTITIONER

## 2025-07-24 RX ORDER — GABAPENTIN 600 MG/1
600 TABLET ORAL 4 TIMES DAILY
Qty: 360 TABLET | Refills: 0 | Status: SHIPPED | OUTPATIENT
Start: 2025-07-24 | End: 2025-10-22

## 2025-07-24 ASSESSMENT — ENCOUNTER SYMPTOMS
BLOOD IN STOOL: 0
SHORTNESS OF BREATH: 0

## 2025-07-24 NOTE — PROGRESS NOTES
Charly Herron  (1980)    7/24/2025    Subjective:     Charly Herron is 44 y.o. male who complains today of:    Chief Complaint   Patient presents with    Follow-up    Back Pain    Shoulder Pain     Left         Allergies:  Patient has no known allergies.    Past Medical History:   Diagnosis Date    Chronic back pain      Past Surgical History:   Procedure Laterality Date    HERNIA REPAIR      NECK SURGERY      tumor removed from neck     Family History   Problem Relation Age of Onset    Heart Disease Mother      Social History     Socioeconomic History    Marital status: Single     Spouse name: Not on file    Number of children: Not on file    Years of education: Not on file    Highest education level: Not on file   Occupational History    Not on file   Tobacco Use    Smoking status: Every Day     Current packs/day: 1.00     Average packs/day: 1 pack/day for 10.0 years (10.0 ttl pk-yrs)     Types: Cigarettes    Smokeless tobacco: Never    Tobacco comments:     patient stated he is trying to quit   Substance and Sexual Activity    Alcohol use: Yes     Alcohol/week: 0.0 standard drinks of alcohol     Comment: RARELY    Drug use: Not on file    Sexual activity: Not on file   Other Topics Concern    Not on file   Social History Narrative    Not on file     Social Drivers of Health     Financial Resource Strain: Not on file   Food Insecurity: Not on file   Transportation Needs: Not on file   Physical Activity: Not on file   Stress: Not on file   Social Connections: Not on file   Intimate Partner Violence: Not on file   Housing Stability: Not on file       Current Outpatient Medications on File Prior to Visit   Medication Sig Dispense Refill    diclofenac sodium (VOLTAREN) 1 % GEL Apply 2 g topically 4 times daily as needed for Pain 200 g 2    Tens Unit MISC by Does not apply route Apply to low back as directed. 1 each 0    VENTOLIN  (90 Base) MCG/ACT inhaler INHALE 2 PUFFS EVERY 4 TO 6 HOURS

## 2025-07-25 ENCOUNTER — TELEPHONE (OUTPATIENT)
Age: 45
End: 2025-07-25

## 2025-07-25 NOTE — TELEPHONE ENCOUNTER
LEFT SHOULDER INJ  NO AUTH REQUIRED      OK to schedule procedure approved as above.   Please note sides/levels approved and date range.   (If applicable, sides/levels approved may differ from those ordered)       TO BE SCHEDULED WITH DR. ZAMORA

## 2025-07-28 NOTE — TELEPHONE ENCOUNTER
1ST ATTEMPT.     VM not set up unable to leave a message.     LEFT SHOULDER INJ  NO AUTH REQUIRED

## 2025-07-29 NOTE — TELEPHONE ENCOUNTER
2ND ATTEMPT.      VM not set up unable to leave a message.      LEFT SHOULDER INJ  NO AUTH REQUIRED

## 2025-07-30 NOTE — TELEPHONE ENCOUNTER
3RD ATTEMPT.      VM not set up unable to leave a message.      LEFT SHOULDER INJ  NO AUTH REQUIRED

## 2025-09-03 ENCOUNTER — TRANSCRIBE ORDERS (OUTPATIENT)
Dept: ADMINISTRATIVE | Age: 45
End: 2025-09-03

## 2025-09-03 DIAGNOSIS — R51.9 NONINTRACTABLE HEADACHE, UNSPECIFIED CHRONICITY PATTERN, UNSPECIFIED HEADACHE TYPE: Primary | ICD-10-CM

## 2025-09-15 ENCOUNTER — APPOINTMENT (OUTPATIENT)
Dept: NEUROLOGY | Facility: CLINIC | Age: 45
End: 2025-09-15
Payer: MEDICARE